# Patient Record
Sex: FEMALE | Race: WHITE | Employment: UNEMPLOYED | URBAN - METROPOLITAN AREA
[De-identification: names, ages, dates, MRNs, and addresses within clinical notes are randomized per-mention and may not be internally consistent; named-entity substitution may affect disease eponyms.]

---

## 2023-02-02 LAB — PAP AGE BASED: NORMAL

## 2024-01-22 LAB
APTT: 28.8 SEC (ref 22–32)
BASOPHILS %: 1.08 (ref 0–3)
BASOPHILS ABSOLUTE: 0.09 (ref 0–0.3)
EOSINOPHILS %: 2.21 (ref 0–10)
EOSINOPHILS ABSOLUTE: 0.19 (ref 0–1.1)
HCT VFR BLD CALC: 44.6 % (ref 37–47)
HEMOGLOBIN: 13.8 (ref 12–16)
INR BLD: 1.1 RATIO (ref 0.8–1.2)
LYMPHOCYTE %: 23.91 (ref 20–51.1)
LYMPHOCYTES ABSOLUTE: 2.03 (ref 1–5.5)
MCH RBC QN AUTO: 30.2 PG (ref 28.5–32.5)
MCHC RBC AUTO-ENTMCNC: 31 G/DL (ref 32–37)
MCV RBC AUTO: 97.5 FL (ref 80–94)
MONOCYTES %: 6.07 (ref 1.7–9.3)
MONOCYTES ABSOLUTE: 0.52 (ref 0.1–1)
NEUTROPHILS %: 66.73 (ref 42.2–75.2)
NEUTROPHILS ABSOLUTE: 5.67 (ref 2–8.1)
PDW BLD-RTO: 12.4 % (ref 8.5–15.5)
PLATELET # BLD: 296 THOU/MM3 (ref 130–400)
PROTHROMBIN TIME: 11.9 SEC (ref 9.3–12.5)
RBC: 4.58 M/UL (ref 4.2–5.4)
WBC: 8.5 THOU/ML3 (ref 4.8–10.8)

## 2024-04-29 ENCOUNTER — OFFICE VISIT (OUTPATIENT)
Dept: SURGERY | Age: 39
End: 2024-04-29

## 2024-04-29 VITALS
RESPIRATION RATE: 18 BRPM | DIASTOLIC BLOOD PRESSURE: 76 MMHG | WEIGHT: 156 LBS | HEIGHT: 67 IN | OXYGEN SATURATION: 99 % | SYSTOLIC BLOOD PRESSURE: 122 MMHG | BODY MASS INDEX: 24.48 KG/M2 | HEART RATE: 79 BPM

## 2024-04-29 DIAGNOSIS — F17.200 SMOKER: ICD-10-CM

## 2024-04-29 DIAGNOSIS — Z80.3 FAMILY HISTORY OF BREAST CANCER: ICD-10-CM

## 2024-04-29 DIAGNOSIS — N64.4 BREAST PAIN: ICD-10-CM

## 2024-04-29 DIAGNOSIS — R92.343 EXTREMELY DENSE TISSUE OF BOTH BREASTS ON MAMMOGRAPHY: ICD-10-CM

## 2024-04-29 DIAGNOSIS — Z91.89 AT HIGH RISK FOR BREAST CANCER: Primary | ICD-10-CM

## 2024-04-29 DIAGNOSIS — N60.19 MULTIPLE CYSTS OF BREAST: ICD-10-CM

## 2024-04-29 RX ORDER — SPIRONOLACTONE 100 MG/1
100 TABLET, FILM COATED ORAL DAILY
COMMUNITY

## 2024-04-29 RX ORDER — BUSPIRONE HYDROCHLORIDE 15 MG/1
15 TABLET ORAL 2 TIMES DAILY
COMMUNITY
Start: 2024-03-07 | End: 2025-03-07

## 2024-04-29 RX ORDER — FLUOXETINE HYDROCHLORIDE 20 MG/1
20 CAPSULE ORAL DAILY
COMMUNITY
Start: 2024-03-07

## 2024-04-29 RX ORDER — TRAZODONE HYDROCHLORIDE 50 MG/1
50 TABLET ORAL NIGHTLY
COMMUNITY
Start: 2024-03-07

## 2024-04-29 NOTE — PROGRESS NOTES
Review of Systems   Constitutional:  Positive for activity change and fatigue. Negative for appetite change, chills, diaphoresis, fever and unexpected weight change.   Respiratory:  Positive for chest tightness. Negative for apnea, cough, shortness of breath, wheezing and stridor.    Cardiovascular:  Negative for chest pain and leg swelling.   Gastrointestinal:  Negative for abdominal distention, abdominal pain, anal bleeding, blood in stool, constipation, diarrhea, nausea, rectal pain and vomiting.   Genitourinary:  Negative for difficulty urinating, dysuria, enuresis, flank pain, frequency, hematuria and urgency.   Musculoskeletal:  Positive for back pain.   Skin:  Negative for color change and pallor.   Allergic/Immunologic: Negative for food allergies and immunocompromised state.   Neurological:  Positive for light-headedness and headaches. Negative for syncope, speech difficulty, weakness and numbness.   Hematological:  Negative for adenopathy. Bruises/bleeds easily.   Psychiatric/Behavioral:  The patient is nervous/anxious.        
file   Social History Narrative    Not on file     Social Determinants of Health     Financial Resource Strain: Not on file   Food Insecurity: Not on file   Transportation Needs: Not on file   Physical Activity: Not on file   Stress: Not on file   Social Connections: Not on file   Intimate Partner Violence: Not on file   Housing Stability: Not on file       Review of Systems:   Constitutional:  Positive for activity change and fatigue. Negative for appetite change, chills, diaphoresis, fever and unexpected weight change.   Respiratory:  Positive for chest tightness. Negative for apnea, cough, shortness of breath, wheezing and stridor.    Cardiovascular:  Negative for chest pain and leg swelling.   Gastrointestinal:  Negative for abdominal distention, abdominal pain, anal bleeding, blood in stool, constipation, diarrhea, nausea, rectal pain and vomiting.   Genitourinary:  Negative for difficulty urinating, dysuria, enuresis, flank pain, frequency, hematuria and urgency.   Musculoskeletal:  Positive for back pain.   Skin:  Negative for color change and pallor.   Allergic/Immunologic: Negative for food allergies and immunocompromised state.   Neurological:  Positive for light-headedness and headaches. Negative for syncope, speech difficulty, weakness and numbness.   Hematological:  Negative for adenopathy. Bruises/bleeds easily.   Psychiatric/Behavioral:  The patient is nervous/anxious.         Physical Exam:  Vitals:    04/29/24 1343   BP: 122/76   Pulse: 79   Resp: 18   SpO2: 99%     General:A & O x3  HEENT:  NCAT, no mass palpated on neck exam  BREAST: The bilateral breasts are symmetric with no skin changes, nipple deformity, discharge, or discrete masses.  She has tenderness to palpation especially her bilateral upper outer quadrants.  There is no discrete mass associated with this tenderness palpation she noted that the palpation was very light and elicit tenderness.  The breast exam was not very thorough because

## 2024-05-01 ENCOUNTER — OFFICE VISIT (OUTPATIENT)
Dept: SURGERY | Age: 39
End: 2024-05-01
Payer: COMMERCIAL

## 2024-05-01 VITALS
RESPIRATION RATE: 18 BRPM | BODY MASS INDEX: 24.48 KG/M2 | OXYGEN SATURATION: 99 % | HEART RATE: 74 BPM | WEIGHT: 156 LBS | HEIGHT: 67 IN | SYSTOLIC BLOOD PRESSURE: 120 MMHG | DIASTOLIC BLOOD PRESSURE: 74 MMHG

## 2024-05-01 DIAGNOSIS — Z91.89 AT HIGH RISK FOR BREAST CANCER: Primary | ICD-10-CM

## 2024-05-01 DIAGNOSIS — Z01.818 PRE-OP TESTING: Primary | ICD-10-CM

## 2024-05-01 PROCEDURE — 99203 OFFICE O/P NEW LOW 30 MIN: CPT | Performed by: PLASTIC SURGERY

## 2024-05-01 NOTE — PROGRESS NOTES
BREAST WORKSHEET     Weight:     Height:      BMI: There is no height or weight on file to calculate BMI.     Marital Status:        [ ] S       [ ] M       [ ] D        [ ] W  How many pregnancies:   Children:   Planning to breast feed in the future?     PATIENT HISTORY  History of Breast Cancer:      [ ] Yes       [ ] No      [ ] Right    [ ] Left     [ ] Bilateral  Last Mammogram:        Where:   Family History of Breast Cancer?    [ ] Yes        [ ] No     Breast Masses:       [ ] Right     [ ] Left     Year:  Breast Biopsies:                 [ ] Right     [ ] Left     Year:  Previous Breast Surgeries:      [ ] Yes       [ ] No       Year:             Type:   Mastectomy:        [ ] Right     [ ] Left     Year:  Lumpectomy:        [ ] Right     [ ] Left     Year:  Radiation Treatment:       [ ] Yes       [ ] No       Year:               Where?        Doctor?   Miscellaneous:       [ ] Fibrocystic changes            [ ] Systemic Disease   [ ] Mastalgia                              [ ] Diabetes  Bleeding Problems:        [ ] Yes       [ ] No        Type:        Allergies:   Allergies as of 05/01/2024 - Fully Reviewed 04/29/2024   Allergen Reaction Noted    Cephalosporins Anaphylaxis 07/28/2015    Ceclor [cefaclor]  08/25/2012     Other:     PHYSICAL EXAM  Chest Wall        [ ] Pectus Deform      [ ] Scoliosis      [ ] Scars      [ ] Venous Stasis  Asymmetries:         [ ] Right      [ ] Left      [ ] Bilateral  Re-Construction       [ ] Right      [ ] Left      [ ] Bilateral  Tubular:Discussed Options:       [ ] Right      [ ] Left      [ ] Bilateral  Striae:          [ ] Right      [ ] Left      [ ] Bilateral  Other:     NEUROLOGICAL EXAM  Normal:        [ ] Yes       [ ] No           Problem/Explain:   MEASUREMENTS     Chest Circumference in Inches   Above the breast: Chest Circumference in Inches   Bellow the breast:   Breast Girth (inches)                                                         Sternal Notch 
changes that are more genetically controlled. If revision surgery is either desired or advisable within one year after the initial surgery, there may be a physician's fee.  Additionally, there may be fees associated with the hospital, facility, anesthesia, pathology, lab, and any supplies such as implants, etc. Revision policy and courtesy discounts only apply to patients who comply with post-op orders and visits.    Electronically signed by:  Zahira Trevino MD 5/1/2024

## 2024-05-03 ENCOUNTER — TELEPHONE (OUTPATIENT)
Dept: PHARMACY | Age: 39
End: 2024-05-03

## 2024-05-03 NOTE — TELEPHONE ENCOUNTER
[facility] Medication Management Clinic Note  Called patient in regard to smoking cessation referral for medication management from Dr. Barry. Patient vapes and needs to quit prior to surgery. Patient states she did quit yesterday and so far is doing ok. Discussed some medication options if the cold-turkey approach does not work well for patient. Patient lives in [place] so not super close to our location, we will close referral at this time but if patient is not successful with this smoking attempt on her own she will call clinic and arrange a visit.    Mari Cool, PharmD  [facility]  5/3/2024 3:20 PM

## 2024-05-22 ENCOUNTER — HOSPITAL ENCOUNTER (OUTPATIENT)
Dept: MRI IMAGING | Age: 39
Discharge: HOME | End: 2024-05-24
Attending: SURGERY
Payer: COMMERCIAL

## 2024-05-22 DIAGNOSIS — Z91.89 AT HIGH RISK FOR BREAST CANCER: ICD-10-CM

## 2024-05-22 DIAGNOSIS — R92.343 EXTREMELY DENSE TISSUE OF BOTH BREASTS ON MAMMOGRAPHY: ICD-10-CM

## 2024-05-22 DIAGNOSIS — N60.19 MULTIPLE CYSTS OF BREAST: ICD-10-CM

## 2024-05-22 DIAGNOSIS — Z80.3 FAMILY HISTORY OF BREAST CANCER: ICD-10-CM

## 2024-05-22 DIAGNOSIS — F17.200 SMOKER: ICD-10-CM

## 2024-05-22 DIAGNOSIS — N64.4 BREAST PAIN: ICD-10-CM

## 2024-05-22 PROCEDURE — C8908 MRI W/O FOL W/CONT, BREAST,: HCPCS

## 2024-05-22 PROCEDURE — 6360000004 HC RX CONTRAST MEDICATION: Performed by: SURGERY

## 2024-05-22 PROCEDURE — A9579 GAD-BASE MR CONTRAST NOS,1ML: HCPCS | Performed by: SURGERY

## 2024-05-22 RX ADMIN — GADOTERIDOL 20 ML: 279.3 INJECTION, SOLUTION INTRAVENOUS at 14:53

## 2024-05-24 ENCOUNTER — HOSPITAL ENCOUNTER (OUTPATIENT)
Age: 39
Discharge: HOME | End: 2024-05-24
Payer: COMMERCIAL

## 2024-05-24 DIAGNOSIS — Z01.818 PRE-OP TESTING: ICD-10-CM

## 2024-05-24 PROCEDURE — G0480 DRUG TEST DEF 1-7 CLASSES: HCPCS

## 2024-05-28 ENCOUNTER — TELEPHONE (OUTPATIENT)
Dept: SURGERY | Age: 39
End: 2024-05-28

## 2024-05-28 ENCOUNTER — TELEPHONE (OUTPATIENT)
Dept: MAMMOGRAPHY | Age: 39
End: 2024-05-28

## 2024-05-28 NOTE — TELEPHONE ENCOUNTER
----- Message from Virginie Mejia sent at 5/25/2024  8:24 AM EDT -----  Regarding: MRI results   Contact: [phone]  Hi there I was just wondering how long it would take for the MRI results I had taken the 22nd and if I should expect more testing or what comes next? Thank you for your time.

## 2024-05-28 NOTE — TELEPHONE ENCOUNTER
Could you please enter an order for a limited left breast US? This request is from the [facility] imaging done. Thank you

## 2024-05-29 DIAGNOSIS — R92.8 ABNORMAL MAGNETIC RESONANCE IMAGING OF LEFT BREAST: Primary | ICD-10-CM

## 2024-05-29 LAB
3-OH-COTININE URINE: <50 NG/ML
ANABASINE URINE: <5 NG/ML
COTININE, URINE: <15 NG/ML
NICOTINE URINE: <15 NG/ML

## 2024-06-05 ENCOUNTER — OFFICE VISIT (OUTPATIENT)
Dept: SURGERY | Age: 39
End: 2024-06-05

## 2024-06-05 VITALS
DIASTOLIC BLOOD PRESSURE: 77 MMHG | RESPIRATION RATE: 16 BRPM | HEART RATE: 77 BPM | WEIGHT: 169 LBS | SYSTOLIC BLOOD PRESSURE: 119 MMHG | OXYGEN SATURATION: 96 % | BODY MASS INDEX: 26.53 KG/M2 | HEIGHT: 67 IN

## 2024-06-05 DIAGNOSIS — Z80.3 FAMILY HISTORY OF BREAST CANCER: ICD-10-CM

## 2024-06-05 DIAGNOSIS — R92.343 EXTREMELY DENSE TISSUE OF BOTH BREASTS ON MAMMOGRAPHY: ICD-10-CM

## 2024-06-05 DIAGNOSIS — Z91.89 AT HIGH RISK FOR BREAST CANCER: Primary | ICD-10-CM

## 2024-06-05 NOTE — PROGRESS NOTES
Patient's Name/Date of Birth: Virginie Mejia / 1985 (39 y.o.)    Date: June 5, 2024    HPI: Pt is a 39 y.o. female who presents to [facility] for surgical planning for prophylactic bilateral mastectomies.  She is at high risk of breast cancer especially with family history for breast cancer and extremely dense tissue of her bilateral breast on mammography.  She has been seen by plastic surgeon.    Since her last visit she has had MRIs of her bilateral breast.  That was performed on 5/22/2024 it showed a subcentimeter oval enhancing mass in the left breast at 4-5 o'clock 7 cm from the nipple that measured 2.5 cm.  A second ultrasound was recommended for further evaluation.  The right breast was normal.  He was given BI-RADS 0.  The ultrasound is pending of that lesion.    Past Medical History:   Diagnosis Date    Psychiatric problem     depression and anxiety       Past Surgical History:   Procedure Laterality Date    ADENOIDECTOMY      TONSILLECTOMY         Current Outpatient Medications   Medication Sig Dispense Refill    busPIRone (BUSPAR) 15 MG tablet Take 15 mg by mouth 2 times daily      vitamin D (CHOLECALCIFEROL) 50 MCG (2000 UT) CAPS capsule Take 1 capsule by mouth daily      FLUoxetine (PROZAC) 20 MG capsule Take 1 capsule by mouth daily      spironolactone (ALDACTONE) 100 MG tablet Take 1 tablet by mouth daily      traZODone (DESYREL) 50 MG tablet Take 1 tablet by mouth nightly      ferrous sulfate (FE TABS) 325 (65 FE) MG EC tablet Take 1 tablet by mouth 2 times daily. (Patient not taking: Reported on 7/3/2024) 90 tablet 3     No current facility-administered medications for this visit.       Allergies   Allergen Reactions    Cephalosporins Anaphylaxis     In childhood    Ceclor [Cefaclor]        Family History   Problem Relation Age of Onset    Depression Mother     Asthma Father     Cancer Father     Diabetes Father     High Blood Pressure Father     High Cholesterol Father

## 2024-06-05 NOTE — PATIENT INSTRUCTIONS
Scheduling for the week of July 25th for bilateral nipple sparing mastectomies.  We will see what the second look ultrasound shows and adjust our plan accordingly.

## 2024-06-12 ENCOUNTER — HOSPITAL ENCOUNTER (OUTPATIENT)
Dept: ULTRASOUND IMAGING | Age: 39
Discharge: HOME | End: 2024-06-14
Payer: COMMERCIAL

## 2024-06-12 DIAGNOSIS — R92.8 ABNORMAL MAGNETIC RESONANCE IMAGING OF LEFT BREAST: ICD-10-CM

## 2024-06-12 PROCEDURE — 76642 ULTRASOUND BREAST LIMITED: CPT

## 2024-06-20 ENCOUNTER — TELEPHONE (OUTPATIENT)
Dept: SURGERY | Age: 39
End: 2024-06-20

## 2024-06-20 NOTE — TELEPHONE ENCOUNTER
I spoke with patient to schedule her surgery at [facility]. Info sent thru or[facility].    Surg appt 7/25 @ 10:30  Arrival time 8:30  PAT 7/10 @ 10:00  Post op 8/7 @ 10:00

## 2024-07-03 ENCOUNTER — OFFICE VISIT (OUTPATIENT)
Dept: SURGERY | Age: 39
End: 2024-07-03
Payer: COMMERCIAL

## 2024-07-03 VITALS
BODY MASS INDEX: 27.47 KG/M2 | WEIGHT: 175 LBS | HEIGHT: 67 IN | DIASTOLIC BLOOD PRESSURE: 79 MMHG | SYSTOLIC BLOOD PRESSURE: 122 MMHG | HEART RATE: 102 BPM

## 2024-07-03 DIAGNOSIS — Z91.89 AT HIGH RISK FOR BREAST CANCER: Primary | ICD-10-CM

## 2024-07-03 PROCEDURE — 99213 OFFICE O/P EST LOW 20 MIN: CPT | Performed by: PLASTIC SURGERY

## 2024-07-03 NOTE — PROGRESS NOTES
increased activity of any kind may open these vessels leading to a bleed, or hematoma.  Activity that increases your pulse or heart rate may cause additional bruising, swelling, and the need for return to surgery to control bleeding.  It is wise to refrain from intimate physical activities until your physician states it is safe.     Mental Health Disorders and Elective Surgery:   It is important that all patients seeking to undergo elective surgery have realistic expectations that focus on improvement rather than perfection.  Complications or less than satisfactory results are sometimes unavoidable, may require additional surgery and often are stressful.  Please openly discuss with your surgeon, prior to surgery, any history that you may have of significant emotional depression or mental health disorders.  Although many individuals may benefit psychologically from the results of elective surgery, effects on mental health cannot be accurately predicted.    Removal / Replacement of Tissue Expander:   Tissue expander breast reconstruction is a two-step process.  Removal of the tissue expander, revision of the surrounding scar tissue envelope, or replacement of tissue expander with a permanent breast implant involves surgical procedures with risks and potential complications. There may be an unacceptable appearance of the breasts following removal of the implant.    DVT/PE Risks and Advisory:  There is a risk of blood clots, Deep Vein Thrombosis (DVT) and Pulmonary Embolus (PE) with every surgical procedure. It varies with the risk factors below. The higher the risk factors, the greater the risk and the more involved you must be in both understanding these risks and, when permitted by your physician, walking and moving your legs. There may also be leg stockings, squeezing active leg devices, and possibly medicines to help lower your risk.   There are many conditions that may increase or affect risks of clotting.

## 2024-07-10 ENCOUNTER — HOSPITAL ENCOUNTER (OUTPATIENT)
Dept: PREADMISSION TESTING | Age: 39
Discharge: HOME | End: 2024-07-10
Payer: COMMERCIAL

## 2024-07-10 VITALS
SYSTOLIC BLOOD PRESSURE: 127 MMHG | TEMPERATURE: 97.1 F | WEIGHT: 175 LBS | RESPIRATION RATE: 16 BRPM | HEIGHT: 67 IN | HEART RATE: 79 BPM | BODY MASS INDEX: 27.47 KG/M2 | DIASTOLIC BLOOD PRESSURE: 84 MMHG | OXYGEN SATURATION: 100 %

## 2024-07-10 LAB
ANION GAP SERPL CALCULATED.3IONS-SCNC: 8 MMOL/L (ref 9–17)
BUN SERPL-MCNC: 10 MG/DL (ref 6–20)
BUN/CREAT SERPL: 17 (ref 9–20)
CALCIUM SERPL-MCNC: 8.6 MG/DL (ref 8.6–10.4)
CHLORIDE SERPL-SCNC: 103 MMOL/L (ref 98–107)
CO2 SERPL-SCNC: 26 MMOL/L (ref 20–31)
CREAT SERPL-MCNC: 0.6 MG/DL (ref 0.5–0.9)
EKG ATRIAL RATE: 65 BPM
EKG P-R INTERVAL: 138 MS
EKG Q-T INTERVAL: 424 MS
EKG QRS DURATION: 78 MS
EKG QTC CALCULATION (BAZETT): 440 MS
EKG R AXIS: 53 DEGREES
EKG T AXIS: 51 DEGREES
EKG VENTRICULAR RATE: 65 BPM
ERYTHROCYTE [DISTWIDTH] IN BLOOD BY AUTOMATED COUNT: 12.6 % (ref 11.8–14.4)
GFR, ESTIMATED: >90 ML/MIN/1.73M2
GLUCOSE SERPL-MCNC: 93 MG/DL (ref 70–99)
HCT VFR BLD AUTO: 40.8 % (ref 36.3–47.1)
HGB BLD-MCNC: 13.3 G/DL (ref 11.9–15.1)
MCH RBC QN AUTO: 31.3 PG (ref 25.2–33.5)
MCHC RBC AUTO-ENTMCNC: 32.6 G/DL (ref 28.4–34.8)
MCV RBC AUTO: 96 FL (ref 82.6–102.9)
NRBC BLD-RTO: 0 PER 100 WBC
PLATELET # BLD AUTO: 248 K/UL (ref 138–453)
PMV BLD AUTO: 10.1 FL (ref 8.1–13.5)
POTASSIUM SERPL-SCNC: 4 MMOL/L (ref 3.7–5.3)
RBC # BLD AUTO: 4.25 M/UL (ref 3.95–5.11)
SODIUM SERPL-SCNC: 137 MMOL/L (ref 135–144)
WBC OTHER # BLD: 9.8 K/UL (ref 3.5–11.3)

## 2024-07-10 PROCEDURE — 85027 COMPLETE CBC AUTOMATED: CPT

## 2024-07-10 PROCEDURE — 36415 COLL VENOUS BLD VENIPUNCTURE: CPT

## 2024-07-10 PROCEDURE — 93005 ELECTROCARDIOGRAM TRACING: CPT | Performed by: ANESTHESIOLOGY

## 2024-07-10 PROCEDURE — 80048 BASIC METABOLIC PNL TOTAL CA: CPT

## 2024-07-10 PROCEDURE — G0480 DRUG TEST DEF 1-7 CLASSES: HCPCS

## 2024-07-10 NOTE — PRE-PROCEDURE INSTRUCTIONS
On the Day of Your Surgery, Thursday, 7/25/24, Please Arrive At 0830 AM     Enter the hospital through the Main Entrance, take the lobby elevators to the second floor and check in at the Surgery Registration desk.     Continue to take your home medications as you normally do up to and including the night before surgery with the exception of blood thinning medications.    Blood Thinning Medications:  Please stop prescription blood thinning medications such as Apixaban (Eliquis); Clopidogrel (Plavix); Dabigatran (Pradaxa); Prasugrel (Effient); Rivaroxaban (Xarelto); Ticagrelor (Brilinta); Warfarin (Coumadin) only as directed by your surgeon and/or the prescribing physician    Some common examples of other medications that can thin your blood are: Aspirin, Ibuprofen (Advil, Motrin), Naproxen (Aleve), Meloxicam (Mobic), Celecoxib (Celebrex), Fish Oil, many Herbal Supplements.  These medications should usually be stopped at least 7 days prior to surgery.        Tylenol is OK to take for pain the week prior to surgery.    Failure to stop certain medications may interfere with your scheduled surgery.    If you receive instructions from your surgeon regarding what medications to stop prior to surgery, please follow those specific instructions.    Please take the following medication(s) the day of surgery with small sips of water:              Fluoxetine, buspirone.     If you are on medicare and there is a possibility that you will be admitted following surgery:   Please bring your prescription medications in their original bottles with pharmacy label on the day of surgery.    Please use your inhaler(s) if needed and bring your inhaler(s) from home the day of surgery.    Showering Before Surgery:     You can play an important role in your own health by carefully washing before surgery. Shower the night before and the morning of surgery using the instructions below. If you are allergic to Chlorhexidine Gluconate (CHG) use  tobacco after midnight.  No alcoholic beverages for 24 hours prior to surgery.  2. You may brush your teeth but do not swallow the water.  3. If you wear glasses bring a case for them if you have one.  No contacts should be worn the day of surgery.  You may also bring your hearing aids. If you have dentures, most surgical procedures involving anesthesia will require that you remove them prior to surgery.  4. If you sleep with a CPAP or BiPAP machine at home and plan on staying in the hospital overnight after surgery, please bring your machine with you.   5. Do not wear any jewelry or body piercings the day of surgery.  No nail polish on the operative extremity (arm/hand or leg/foot surgeries)   6. If you are staying overnight with us, you may bring a small bag of necessary personal items.   7. Please wear loose, comfortable clothing.  If you are potentially going to have a cast, sling, brace or bulky dressing, make sure to wear clothing that will fit over it.   8. In case of illness - If you have cold or flu like symptoms (high fever, runny nose, sore throat, cough, etc.) rash, nausea, vomiting, loose stools, and/or recent contact with someone who has a contagious disease (chicken pox, measles, COVID-19, etc.).  Please call your surgeon before coming to the hospital.    Transportation After Your Surgery/Procedure:     If you are going home the same day of surgery you need someone to drive you home.  Your  must be at least 18 years of age.  A taxi cab or other nonmedical public transportation is not acceptable unless you have someone to ride home in the vehicle with you.   For your safety, someone must remain with you for the first 24 hours after your surgery if you receive anesthesia or medication.  If you do not have someone to stay with you, your procedure may be cancelled.  As a patient at [facility] you can expect quality medical and nursing care that is centered on you individual needs.  Our

## 2024-07-24 ENCOUNTER — ANESTHESIA EVENT (OUTPATIENT)
Dept: OPERATING ROOM | Age: 39
End: 2024-07-24
Payer: COMMERCIAL

## 2024-07-25 ENCOUNTER — HOSPITAL ENCOUNTER (OUTPATIENT)
Age: 39
Discharge: HOME | End: 2024-07-26
Attending: SURGERY | Admitting: SURGERY
Payer: COMMERCIAL

## 2024-07-25 ENCOUNTER — ANESTHESIA (OUTPATIENT)
Dept: OPERATING ROOM | Age: 39
End: 2024-07-25
Payer: COMMERCIAL

## 2024-07-25 DIAGNOSIS — R92.8 ABNORMAL MRI, BREAST: ICD-10-CM

## 2024-07-25 PROBLEM — Z91.89 AT HIGH RISK FOR BREAST CANCER: Status: ACTIVE | Noted: 2024-07-25

## 2024-07-25 LAB
HCG, PREGNANCY URINE (POC): NEGATIVE
HCT VFR BLD AUTO: 30.4 % (ref 36.3–47.1)
HGB BLD-MCNC: 9.8 G/DL (ref 11.9–15.1)

## 2024-07-25 PROCEDURE — 2709999900 HC NON-CHARGEABLE SUPPLY: Performed by: SURGERY

## 2024-07-25 PROCEDURE — 85018 HEMOGLOBIN: CPT

## 2024-07-25 PROCEDURE — 7100000001 HC PACU RECOVERY - ADDTL 15 MIN: Performed by: SURGERY

## 2024-07-25 PROCEDURE — 6360000002 HC RX W HCPCS: Performed by: PLASTIC SURGERY

## 2024-07-25 PROCEDURE — 3700000000 HC ANESTHESIA ATTENDED CARE: Performed by: SURGERY

## 2024-07-25 PROCEDURE — 6370000000 HC RX 637 (ALT 250 FOR IP): Performed by: ANESTHESIOLOGY

## 2024-07-25 PROCEDURE — 3600000012 HC SURGERY LEVEL 2 ADDTL 15MIN: Performed by: SURGERY

## 2024-07-25 PROCEDURE — 36415 COLL VENOUS BLD VENIPUNCTURE: CPT

## 2024-07-25 PROCEDURE — 6360000002 HC RX W HCPCS: Performed by: ANESTHESIOLOGY

## 2024-07-25 PROCEDURE — 2500000003 HC RX 250 WO HCPCS: Performed by: SURGERY

## 2024-07-25 PROCEDURE — 7100000000 HC PACU RECOVERY - FIRST 15 MIN: Performed by: SURGERY

## 2024-07-25 PROCEDURE — A4217 STERILE WATER/SALINE, 500 ML: HCPCS | Performed by: PLASTIC SURGERY

## 2024-07-25 PROCEDURE — 6360000002 HC RX W HCPCS: Performed by: SURGERY

## 2024-07-25 PROCEDURE — 85014 HEMATOCRIT: CPT

## 2024-07-25 PROCEDURE — 6370000000 HC RX 637 (ALT 250 FOR IP): Performed by: SURGERY

## 2024-07-25 PROCEDURE — 76942 ECHO GUIDE FOR BIOPSY: CPT | Performed by: ANESTHESIOLOGY

## 2024-07-25 PROCEDURE — 2500000003 HC RX 250 WO HCPCS

## 2024-07-25 PROCEDURE — 88342 IMHCHEM/IMCYTCHM 1ST ANTB: CPT

## 2024-07-25 PROCEDURE — 6360000002 HC RX W HCPCS: Performed by: NURSE ANESTHETIST, CERTIFIED REGISTERED

## 2024-07-25 PROCEDURE — 2580000003 HC RX 258: Performed by: ANESTHESIOLOGY

## 2024-07-25 PROCEDURE — 3600000002 HC SURGERY LEVEL 2 BASE: Performed by: SURGERY

## 2024-07-25 PROCEDURE — 15777 ACELLULAR DERM MATRIX IMPLT: CPT | Performed by: PLASTIC SURGERY

## 2024-07-25 PROCEDURE — C1889 IMPLANT/INSERT DEVICE, NOC: HCPCS | Performed by: SURGERY

## 2024-07-25 PROCEDURE — 19357 TISS XPNDR PLMT BRST RCNSTJ: CPT | Performed by: PLASTIC SURGERY

## 2024-07-25 PROCEDURE — 81025 URINE PREGNANCY TEST: CPT

## 2024-07-25 PROCEDURE — 3700000001 HC ADD 15 MINUTES (ANESTHESIA): Performed by: SURGERY

## 2024-07-25 PROCEDURE — 19303 MAST SIMPLE COMPLETE: CPT | Performed by: SURGERY

## 2024-07-25 PROCEDURE — 2500000003 HC RX 250 WO HCPCS: Performed by: NURSE ANESTHETIST, CERTIFIED REGISTERED

## 2024-07-25 PROCEDURE — 2720000010 HC SURG SUPPLY STERILE: Performed by: SURGERY

## 2024-07-25 PROCEDURE — 88307 TISSUE EXAM BY PATHOLOGIST: CPT

## 2024-07-25 PROCEDURE — 2580000003 HC RX 258: Performed by: PLASTIC SURGERY

## 2024-07-25 PROCEDURE — 6360000002 HC RX W HCPCS: Performed by: SPECIALIST

## 2024-07-25 PROCEDURE — 2580000003 HC RX 258: Performed by: SURGERY

## 2024-07-25 PROCEDURE — P9041 ALBUMIN (HUMAN),5%, 50ML: HCPCS | Performed by: SPECIALIST

## 2024-07-25 PROCEDURE — 6360000002 HC RX W HCPCS

## 2024-07-25 DEVICE — BREAST TISSUE EXPANDER, SUTURE TABS, INTEGRAL INJECTION DOME, 550CC
Type: IMPLANTABLE DEVICE | Site: BREAST | Status: FUNCTIONAL
Brand: MENTOR CPX4 PLUS, SMOOTH, MEDIUM HEIGHT

## 2024-07-25 DEVICE — TISSUE ALLODERM SELECT 16X20CM: Type: IMPLANTABLE DEVICE | Site: BREAST | Status: FUNCTIONAL

## 2024-07-25 RX ORDER — ALBUMIN, HUMAN INJ 5% 5 %
SOLUTION INTRAVENOUS PRN
Status: DISCONTINUED | OUTPATIENT
Start: 2024-07-25 | End: 2024-07-25 | Stop reason: SDUPTHER

## 2024-07-25 RX ORDER — DEXTROSE MONOHYDRATE, SODIUM CHLORIDE, AND POTASSIUM CHLORIDE 50; 1.49; 4.5 G/1000ML; G/1000ML; G/1000ML
INJECTION, SOLUTION INTRAVENOUS CONTINUOUS
Status: DISCONTINUED | OUTPATIENT
Start: 2024-07-25 | End: 2024-07-26 | Stop reason: HOSPADM

## 2024-07-25 RX ORDER — MEPERIDINE HYDROCHLORIDE 50 MG/ML
12.5 INJECTION INTRAMUSCULAR; INTRAVENOUS; SUBCUTANEOUS EVERY 5 MIN PRN
Status: DISCONTINUED | OUTPATIENT
Start: 2024-07-25 | End: 2024-07-25 | Stop reason: HOSPADM

## 2024-07-25 RX ORDER — ONDANSETRON 2 MG/ML
4 INJECTION INTRAMUSCULAR; INTRAVENOUS EVERY 6 HOURS PRN
Status: DISCONTINUED | OUTPATIENT
Start: 2024-07-25 | End: 2024-07-26 | Stop reason: HOSPADM

## 2024-07-25 RX ORDER — SODIUM CHLORIDE 9 MG/ML
INJECTION, SOLUTION INTRAVENOUS CONTINUOUS
Status: DISCONTINUED | OUTPATIENT
Start: 2024-07-25 | End: 2024-07-25 | Stop reason: HOSPADM

## 2024-07-25 RX ORDER — BISACODYL 5 MG/1
5 TABLET, DELAYED RELEASE ORAL DAILY
Status: DISCONTINUED | OUTPATIENT
Start: 2024-07-25 | End: 2024-07-26 | Stop reason: HOSPADM

## 2024-07-25 RX ORDER — SCOLOPAMINE TRANSDERMAL SYSTEM 1 MG/1
PATCH, EXTENDED RELEASE TRANSDERMAL
Status: COMPLETED
Start: 2024-07-25 | End: 2024-07-25

## 2024-07-25 RX ORDER — FAMOTIDINE 10 MG/ML
INJECTION, SOLUTION INTRAVENOUS PRN
Status: DISCONTINUED | OUTPATIENT
Start: 2024-07-25 | End: 2024-07-25 | Stop reason: SDUPTHER

## 2024-07-25 RX ORDER — PROPOFOL 10 MG/ML
INJECTION, EMULSION INTRAVENOUS PRN
Status: DISCONTINUED | OUTPATIENT
Start: 2024-07-25 | End: 2024-07-25 | Stop reason: SDUPTHER

## 2024-07-25 RX ORDER — DEXAMETHASONE SODIUM PHOSPHATE 10 MG/ML
INJECTION, SOLUTION INTRAMUSCULAR; INTRAVENOUS PRN
Status: DISCONTINUED | OUTPATIENT
Start: 2024-07-25 | End: 2024-07-25 | Stop reason: SDUPTHER

## 2024-07-25 RX ORDER — ACETAMINOPHEN 325 MG/1
650 TABLET ORAL EVERY 6 HOURS
Status: DISCONTINUED | OUTPATIENT
Start: 2024-07-25 | End: 2024-07-26 | Stop reason: HOSPADM

## 2024-07-25 RX ORDER — CLINDAMYCIN PHOSPHATE 600 MG/50ML
600 INJECTION, SOLUTION INTRAVENOUS ONCE
Status: COMPLETED | OUTPATIENT
Start: 2024-07-25 | End: 2024-07-25

## 2024-07-25 RX ORDER — SODIUM CHLORIDE, SODIUM LACTATE, POTASSIUM CHLORIDE, CALCIUM CHLORIDE 600; 310; 30; 20 MG/100ML; MG/100ML; MG/100ML; MG/100ML
INJECTION, SOLUTION INTRAVENOUS CONTINUOUS
Status: DISCONTINUED | OUTPATIENT
Start: 2024-07-25 | End: 2024-07-25 | Stop reason: HOSPADM

## 2024-07-25 RX ORDER — MORPHINE SULFATE 4 MG/ML
4 INJECTION, SOLUTION INTRAMUSCULAR; INTRAVENOUS
Status: DISCONTINUED | OUTPATIENT
Start: 2024-07-25 | End: 2024-07-26 | Stop reason: HOSPADM

## 2024-07-25 RX ORDER — SODIUM CHLORIDE 0.9 % (FLUSH) 0.9 %
5-40 SYRINGE (ML) INJECTION PRN
Status: DISCONTINUED | OUTPATIENT
Start: 2024-07-25 | End: 2024-07-25 | Stop reason: HOSPADM

## 2024-07-25 RX ORDER — SODIUM CHLORIDE 9 MG/ML
INJECTION, SOLUTION INTRAVENOUS PRN
Status: DISCONTINUED | OUTPATIENT
Start: 2024-07-25 | End: 2024-07-25 | Stop reason: HOSPADM

## 2024-07-25 RX ORDER — OXYCODONE HYDROCHLORIDE 5 MG/1
5 TABLET ORAL
Status: DISCONTINUED | OUTPATIENT
Start: 2024-07-25 | End: 2024-07-25 | Stop reason: HOSPADM

## 2024-07-25 RX ORDER — OXYCODONE HYDROCHLORIDE 5 MG/1
5 TABLET ORAL EVERY 4 HOURS PRN
Status: DISCONTINUED | OUTPATIENT
Start: 2024-07-25 | End: 2024-07-26 | Stop reason: HOSPADM

## 2024-07-25 RX ORDER — LIDOCAINE HYDROCHLORIDE 10 MG/ML
1 INJECTION, SOLUTION EPIDURAL; INFILTRATION; INTRACAUDAL; PERINEURAL
Status: DISCONTINUED | OUTPATIENT
Start: 2024-07-26 | End: 2024-07-25 | Stop reason: HOSPADM

## 2024-07-25 RX ORDER — SCOLOPAMINE TRANSDERMAL SYSTEM 1 MG/1
PATCH, EXTENDED RELEASE TRANSDERMAL PRN
Status: DISCONTINUED | OUTPATIENT
Start: 2024-07-25 | End: 2024-07-25 | Stop reason: SDUPTHER

## 2024-07-25 RX ORDER — FENTANYL CITRATE 50 UG/ML
25 INJECTION, SOLUTION INTRAMUSCULAR; INTRAVENOUS EVERY 5 MIN PRN
Status: DISCONTINUED | OUTPATIENT
Start: 2024-07-25 | End: 2024-07-25 | Stop reason: HOSPADM

## 2024-07-25 RX ORDER — FLUOXETINE HYDROCHLORIDE 20 MG/1
20 CAPSULE ORAL DAILY
Status: DISCONTINUED | OUTPATIENT
Start: 2024-07-25 | End: 2024-07-26 | Stop reason: HOSPADM

## 2024-07-25 RX ORDER — ONDANSETRON 2 MG/ML
INJECTION INTRAMUSCULAR; INTRAVENOUS PRN
Status: DISCONTINUED | OUTPATIENT
Start: 2024-07-25 | End: 2024-07-25 | Stop reason: SDUPTHER

## 2024-07-25 RX ORDER — SPIRONOLACTONE 100 MG/1
100 TABLET, FILM COATED ORAL DAILY
Status: DISCONTINUED | OUTPATIENT
Start: 2024-07-25 | End: 2024-07-26 | Stop reason: HOSPADM

## 2024-07-25 RX ORDER — PROCHLORPERAZINE EDISYLATE 5 MG/ML
10 INJECTION INTRAMUSCULAR; INTRAVENOUS
Status: COMPLETED | OUTPATIENT
Start: 2024-07-25 | End: 2024-07-25

## 2024-07-25 RX ORDER — SODIUM CHLORIDE, SODIUM LACTATE, POTASSIUM CHLORIDE, AND CALCIUM CHLORIDE .6; .31; .03; .02 G/100ML; G/100ML; G/100ML; G/100ML
1000 INJECTION, SOLUTION INTRAVENOUS ONCE
Status: COMPLETED | OUTPATIENT
Start: 2024-07-25 | End: 2024-07-25

## 2024-07-25 RX ORDER — LIDOCAINE HYDROCHLORIDE 20 MG/ML
INJECTION, SOLUTION EPIDURAL; INFILTRATION; INTRACAUDAL; PERINEURAL PRN
Status: DISCONTINUED | OUTPATIENT
Start: 2024-07-25 | End: 2024-07-25 | Stop reason: SDUPTHER

## 2024-07-25 RX ORDER — MIDAZOLAM HYDROCHLORIDE 1 MG/ML
INJECTION INTRAMUSCULAR; INTRAVENOUS PRN
Status: DISCONTINUED | OUTPATIENT
Start: 2024-07-25 | End: 2024-07-25 | Stop reason: SDUPTHER

## 2024-07-25 RX ORDER — NALOXONE HYDROCHLORIDE 0.4 MG/ML
INJECTION, SOLUTION INTRAMUSCULAR; INTRAVENOUS; SUBCUTANEOUS PRN
Status: DISCONTINUED | OUTPATIENT
Start: 2024-07-25 | End: 2024-07-25 | Stop reason: HOSPADM

## 2024-07-25 RX ORDER — HYDROMORPHONE HYDROCHLORIDE 1 MG/ML
0.5 INJECTION, SOLUTION INTRAMUSCULAR; INTRAVENOUS; SUBCUTANEOUS EVERY 5 MIN PRN
Status: DISCONTINUED | OUTPATIENT
Start: 2024-07-25 | End: 2024-07-25 | Stop reason: HOSPADM

## 2024-07-25 RX ORDER — DIPHENHYDRAMINE HYDROCHLORIDE 50 MG/ML
INJECTION INTRAMUSCULAR; INTRAVENOUS PRN
Status: DISCONTINUED | OUTPATIENT
Start: 2024-07-25 | End: 2024-07-25 | Stop reason: SDUPTHER

## 2024-07-25 RX ORDER — SODIUM CHLORIDE 0.9 % (FLUSH) 0.9 %
5-40 SYRINGE (ML) INJECTION EVERY 12 HOURS SCHEDULED
Status: DISCONTINUED | OUTPATIENT
Start: 2024-07-25 | End: 2024-07-25 | Stop reason: HOSPADM

## 2024-07-25 RX ORDER — ROPIVACAINE HYDROCHLORIDE 2 MG/ML
INJECTION, SOLUTION EPIDURAL; INFILTRATION; PERINEURAL
Status: DISCONTINUED | OUTPATIENT
Start: 2024-07-25 | End: 2024-07-25 | Stop reason: SDUPTHER

## 2024-07-25 RX ORDER — SODIUM CHLORIDE 0.9 % (FLUSH) 0.9 %
5-40 SYRINGE (ML) INJECTION EVERY 12 HOURS SCHEDULED
Status: DISCONTINUED | OUTPATIENT
Start: 2024-07-25 | End: 2024-07-26 | Stop reason: HOSPADM

## 2024-07-25 RX ORDER — MORPHINE SULFATE 2 MG/ML
2 INJECTION, SOLUTION INTRAMUSCULAR; INTRAVENOUS
Status: DISCONTINUED | OUTPATIENT
Start: 2024-07-25 | End: 2024-07-26 | Stop reason: HOSPADM

## 2024-07-25 RX ORDER — KETOROLAC TROMETHAMINE 30 MG/ML
30 INJECTION, SOLUTION INTRAMUSCULAR; INTRAVENOUS ONCE
Status: COMPLETED | OUTPATIENT
Start: 2024-07-25 | End: 2024-07-25

## 2024-07-25 RX ORDER — OXYCODONE HYDROCHLORIDE 5 MG/1
10 TABLET ORAL EVERY 4 HOURS PRN
Status: DISCONTINUED | OUTPATIENT
Start: 2024-07-25 | End: 2024-07-26 | Stop reason: HOSPADM

## 2024-07-25 RX ORDER — ONDANSETRON 4 MG/1
4 TABLET, ORALLY DISINTEGRATING ORAL EVERY 8 HOURS PRN
Status: DISCONTINUED | OUTPATIENT
Start: 2024-07-25 | End: 2024-07-26 | Stop reason: HOSPADM

## 2024-07-25 RX ORDER — DIPHENHYDRAMINE HYDROCHLORIDE 50 MG/ML
12.5 INJECTION INTRAMUSCULAR; INTRAVENOUS
Status: DISCONTINUED | OUTPATIENT
Start: 2024-07-25 | End: 2024-07-25 | Stop reason: HOSPADM

## 2024-07-25 RX ORDER — ROCURONIUM BROMIDE 10 MG/ML
INJECTION, SOLUTION INTRAVENOUS PRN
Status: DISCONTINUED | OUTPATIENT
Start: 2024-07-25 | End: 2024-07-25 | Stop reason: SDUPTHER

## 2024-07-25 RX ORDER — METOCLOPRAMIDE HYDROCHLORIDE 5 MG/ML
10 INJECTION INTRAMUSCULAR; INTRAVENOUS
Status: DISCONTINUED | OUTPATIENT
Start: 2024-07-25 | End: 2024-07-25 | Stop reason: HOSPADM

## 2024-07-25 RX ORDER — TRAZODONE HYDROCHLORIDE 50 MG/1
50 TABLET ORAL NIGHTLY
Status: DISCONTINUED | OUTPATIENT
Start: 2024-07-25 | End: 2024-07-26 | Stop reason: HOSPADM

## 2024-07-25 RX ORDER — FENTANYL CITRATE 50 UG/ML
INJECTION, SOLUTION INTRAMUSCULAR; INTRAVENOUS PRN
Status: DISCONTINUED | OUTPATIENT
Start: 2024-07-25 | End: 2024-07-25 | Stop reason: SDUPTHER

## 2024-07-25 RX ADMIN — TRAZODONE HYDROCHLORIDE 50 MG: 50 TABLET ORAL at 21:34

## 2024-07-25 RX ADMIN — ROPIVACAINE HYDROCHLORIDE 60 ML: 2 INJECTION, SOLUTION EPIDURAL; INFILTRATION at 12:27

## 2024-07-25 RX ADMIN — SODIUM CHLORIDE, POTASSIUM CHLORIDE, SODIUM LACTATE AND CALCIUM CHLORIDE: 600; 310; 30; 20 INJECTION, SOLUTION INTRAVENOUS at 09:36

## 2024-07-25 RX ADMIN — DIPHENHYDRAMINE HYDROCHLORIDE 12.5 MG: 50 INJECTION INTRAMUSCULAR; INTRAVENOUS at 12:16

## 2024-07-25 RX ADMIN — BISACODYL 5 MG: 5 TABLET, COATED ORAL at 18:46

## 2024-07-25 RX ADMIN — PROCHLORPERAZINE EDISYLATE 10 MG: 5 INJECTION INTRAMUSCULAR; INTRAVENOUS at 16:11

## 2024-07-25 RX ADMIN — SPIRONOLACTONE 100 MG: 100 TABLET ORAL at 21:34

## 2024-07-25 RX ADMIN — LIDOCAINE HYDROCHLORIDE 100 MG: 20 INJECTION, SOLUTION EPIDURAL; INFILTRATION; INTRACAUDAL; PERINEURAL at 12:16

## 2024-07-25 RX ADMIN — FENTANYL CITRATE 25 MCG: 50 INJECTION INTRAMUSCULAR; INTRAVENOUS at 12:59

## 2024-07-25 RX ADMIN — CLINDAMYCIN PHOSPHATE 600 MG: 600 INJECTION, SOLUTION INTRAVENOUS at 12:23

## 2024-07-25 RX ADMIN — KETOROLAC TROMETHAMINE 30 MG: 30 INJECTION, SOLUTION INTRAMUSCULAR at 17:13

## 2024-07-25 RX ADMIN — SODIUM CHLORIDE, POTASSIUM CHLORIDE, SODIUM LACTATE AND CALCIUM CHLORIDE: 600; 310; 30; 20 INJECTION, SOLUTION INTRAVENOUS at 15:24

## 2024-07-25 RX ADMIN — SODIUM CHLORIDE, POTASSIUM CHLORIDE, SODIUM LACTATE AND CALCIUM CHLORIDE: 600; 310; 30; 20 INJECTION, SOLUTION INTRAVENOUS at 13:47

## 2024-07-25 RX ADMIN — SUGAMMADEX 100 MG: 100 INJECTION, SOLUTION INTRAVENOUS at 15:45

## 2024-07-25 RX ADMIN — FLUOXETINE HYDROCHLORIDE 20 MG: 20 CAPSULE ORAL at 21:34

## 2024-07-25 RX ADMIN — SODIUM CHLORIDE, PRESERVATIVE FREE 10 ML: 5 INJECTION INTRAVENOUS at 21:34

## 2024-07-25 RX ADMIN — POTASSIUM CHLORIDE, DEXTROSE MONOHYDRATE AND SODIUM CHLORIDE: 150; 5; 450 INJECTION, SOLUTION INTRAVENOUS at 21:48

## 2024-07-25 RX ADMIN — FENTANYL CITRATE 25 MCG: 50 INJECTION INTRAMUSCULAR; INTRAVENOUS at 12:16

## 2024-07-25 RX ADMIN — BUSPIRONE HYDROCHLORIDE 15 MG: 10 TABLET ORAL at 21:34

## 2024-07-25 RX ADMIN — MIDAZOLAM 2 MG: 1 INJECTION INTRAMUSCULAR; INTRAVENOUS at 12:08

## 2024-07-25 RX ADMIN — PROPOFOL 200 MG: 10 INJECTION, EMULSION INTRAVENOUS at 12:16

## 2024-07-25 RX ADMIN — ALBUMIN (HUMAN) 25 G: 12.5 INJECTION, SOLUTION INTRAVENOUS at 13:53

## 2024-07-25 RX ADMIN — PROPOFOL 25 MCG/KG/MIN: 10 INJECTION, EMULSION INTRAVENOUS at 12:24

## 2024-07-25 RX ADMIN — SCOPALAMINE 1 PATCH: 1 PATCH, EXTENDED RELEASE TRANSDERMAL at 11:46

## 2024-07-25 RX ADMIN — ROCURONIUM BROMIDE 50 MG: 10 INJECTION, SOLUTION INTRAVENOUS at 12:16

## 2024-07-25 RX ADMIN — OXYCODONE HYDROCHLORIDE 10 MG: 5 TABLET ORAL at 19:44

## 2024-07-25 RX ADMIN — SODIUM CHLORIDE, POTASSIUM CHLORIDE, SODIUM LACTATE AND CALCIUM CHLORIDE 1000 ML: 600; 310; 30; 20 INJECTION, SOLUTION INTRAVENOUS at 17:14

## 2024-07-25 RX ADMIN — DEXAMETHASONE SODIUM PHOSPHATE 10 MG: 10 INJECTION, SOLUTION INTRAMUSCULAR; INTRAVENOUS at 12:16

## 2024-07-25 RX ADMIN — ONDANSETRON 4 MG: 2 INJECTION INTRAMUSCULAR; INTRAVENOUS at 15:22

## 2024-07-25 RX ADMIN — FENTANYL CITRATE 50 MCG: 50 INJECTION INTRAMUSCULAR; INTRAVENOUS at 13:00

## 2024-07-25 RX ADMIN — FAMOTIDINE 20 MG: 10 INJECTION, SOLUTION INTRAVENOUS at 12:16

## 2024-07-25 RX ADMIN — ACETAMINOPHEN 650 MG: 325 TABLET ORAL at 18:46

## 2024-07-25 RX ADMIN — HYDROMORPHONE HYDROCHLORIDE 0.5 MG: 1 INJECTION, SOLUTION INTRAMUSCULAR; INTRAVENOUS; SUBCUTANEOUS at 16:04

## 2024-07-25 ASSESSMENT — PAIN DESCRIPTION - DESCRIPTORS
DESCRIPTORS: ACHING;SORE
DESCRIPTORS: THROBBING;STABBING
DESCRIPTORS: ACHING;SORE

## 2024-07-25 ASSESSMENT — PAIN DESCRIPTION - LOCATION
LOCATION: CHEST
LOCATION: CHEST
LOCATION: BREAST

## 2024-07-25 ASSESSMENT — PAIN DESCRIPTION - FREQUENCY
FREQUENCY: CONTINUOUS
FREQUENCY: CONTINUOUS

## 2024-07-25 ASSESSMENT — PAIN SCALES - GENERAL
PAINLEVEL_OUTOF10: 6
PAINLEVEL_OUTOF10: 9
PAINLEVEL_OUTOF10: 7
PAINLEVEL_OUTOF10: 3

## 2024-07-25 ASSESSMENT — PAIN - FUNCTIONAL ASSESSMENT
PAIN_FUNCTIONAL_ASSESSMENT: ACTIVITIES ARE NOT PREVENTED
PAIN_FUNCTIONAL_ASSESSMENT: 0-10

## 2024-07-25 ASSESSMENT — PAIN DESCRIPTION - PAIN TYPE
TYPE: SURGICAL PAIN
TYPE: SURGICAL PAIN

## 2024-07-25 ASSESSMENT — PAIN DESCRIPTION - ORIENTATION
ORIENTATION: RIGHT;LEFT
ORIENTATION: RIGHT;LEFT
ORIENTATION: LEFT;RIGHT

## 2024-07-25 ASSESSMENT — PAIN DESCRIPTION - ONSET
ONSET: ON-GOING
ONSET: ON-GOING

## 2024-07-25 NOTE — OP NOTE
Operative Note      Patient: Virginie Mejia  YOB: 1985  MRN: [id number]    Date of Procedure: 7/25/2024    Pre-Op Diagnosis Codes:     * Abnormal MRI, breast [R92.8]    Post-Op Diagnosis: Same       Procedure(s):  BREAST MASTECTOMY BILATERAL NIPPLE SPARING  BILATERAL TISSUE EXPANDER PLACEMENT, BIOPATCH AROUND ANABEL, ADI INCISIONAL BREAST VAC, SERATUS FLAP, PECT BLOCK  Breast weight on the right was 535 g  Breast weight on the left was 535 g  550 cc expander was placed on the right filled with air placed in the prepectoralis pocket  550 cc expander was placed on the left filled with air placed in the prepectoralis pocket  ADM on the right 16 x 20 cm  ADM on the left measuring 16 x 20 cm  Surgeon(s):  Zahira Trevino MD Bland, Keiva, MD    Assistant:   Surgical Assistant: Autumn Law    Anesthesia: General    Estimated Blood Loss (mL): 500    Complications: None    Specimens:   ID Type Source Tests Collected by Time Destination   A : RIGHT BREAST. SHORT STITCH SUPERIOR MARGIN, LONG STITCH LATERAL MARGIN, INK ANTERIOR MARGIN Tissue Breast SURGICAL PATHOLOGY Aurea Barry MD 7/25/2024 1336    B : LEFT BREAST. SHORT STITCH SUPERIOR MARGIN, LONG STITCH LATERAL MARGIN, IN ANTERIOR MARGIN Tissue Breast SURGICAL PATHOLOGY Aurea Barry MD 7/25/2024 1419        Implants:  Implant Name Type Inv. Item Serial No.  Lot No. LRB No. Used Action   EXPANDER TISS 550CC [facility] CPX4 PLUS - [id number]  EXPANDER TISS 550CC [facility] CPX4 PLUS [id number] [employer] [facility] [id number] Right 1 Implanted   TISSUE ALLODERM SELECT [id number] - [id number] Bone/Graft/Tissue/Human/Synth TISSUE ALLODERM SELECT [id number]  [employer] [id number] Right 1 Implanted   TISSUE ALLODERM SELECT [id number] - [id number] Bone/Graft/Tissue/Human/Synth TISSUE ALLODERM SELECT [id number]  [employer] [id number] Left 1 Implanted   EXPANDER TISS 550CC [facility] CPX4 PLUS - [id number]  EXPANDER TISS 550CC [facility] CPX4 PLUS [id number]  soaked in triple antibiotic solution.  The pocket was irrigated with triple antibiotic solution.  Then and #19 Delmar drain was placed through a separate stab wound.  The drain was sutured in place using 2-0 Prolene.  Then the chest wall was cleansed.  The gloves were changed.  Then the expander was placed.  Then the expander was sutured in place using 2-0 PDS using the suture tabs medial, lateral ,superior, medial, and inferior tabs were sutured in place.  Then the skin was redraped.  Then the skin was closed in the following manner: 2-0 Vicryl was used in an interrupted manner in the deep tissue.  And 2-0 Quill was used in a subcuticular manner.     An incisional wound VAC was placed.  Then a surgical bra was placed.  Patient tolerated the procedure well.  Patient was transferred to recovery room in a stable condition    Electronically signed by Zahira Trevino MD on 7/25/2024 at 3:51 PM

## 2024-07-25 NOTE — PROGRESS NOTES
Patient arrived from PACU with family at bedside. Orders released and reviewed. Patient oriented to room. Patient resting in bed with call light in reach side rails up x2 and bed alarm on.

## 2024-07-25 NOTE — ANESTHESIA PRE PROCEDURE
Department of Anesthesiology  Preprocedure Note       Name:  Virginie Mejia   Age:  39 y.o.  :  1985                                          MRN:  [id number]         Date:  2024      Surgeon: Surgeon(s):  Aurea Barry MD Eslami, Jaleh, MD    Procedure: Procedure(s):  BREAST MASTECTOMY BILATERAL NIPPLE SPARING  BILATERAL TISSUE EXPANDER PLACEMENT, BIOPATCH AROUND ANABEL, ADI INCISIONAL BREAST VAC, SERATUS FLAP, PECT BLOCK    Medications prior to admission:   Prior to Admission medications    Medication Sig Start Date End Date Taking? Authorizing Provider   busPIRone (BUSPAR) 15 MG tablet Take 15 mg by mouth 2 times daily 3/7/24 3/7/25  ProviderAlejandra MD   vitamin D (CHOLECALCIFEROL) 50 MCG (2000) CAPS capsule Take 1 capsule by mouth daily 3/7/24   Alejandra Perez MD   FLUoxetine (PROZAC) 20 MG capsule Take 1 capsule by mouth daily 3/7/24   ProviderAlejandra MD   spironolactone (ALDACTONE) 100 MG tablet Take 1 tablet by mouth daily    ProviderAlejandra MD   traZODone (DESYREL) 50 MG tablet Take 1 tablet by mouth nightly 3/7/24   ProviderAlejandra MD   ferrous sulfate (FE TABS) 325 (65 FE) MG EC tablet Take 1 tablet by mouth 2 times daily. 12   Gemini Casillas MD       Current medications:    Current Facility-Administered Medications   Medication Dose Route Frequency Provider Last Rate Last Admin   • [START ON 2024] lidocaine PF 1 % injection 1 mL  1 mL IntraDERmal Once PRN Arsenio Ford DO       • 0.9 % sodium chloride infusion   IntraVENous Continuous Arsenio Ford DO       • lactated ringers IV soln infusion   IntraVENous Continuous Arsenio Ford  mL/hr at 24 0936 New Bag at 24 0936   • sodium chloride flush 0.9 % injection 5-40 mL  5-40 mL IntraVENous 2 times per day Arsenio Ford DO       • sodium chloride flush 0.9 % injection 5-40 mL  5-40 mL IntraVENous PRN Arsenio Ford DO       • 0.9 % sodium chloride

## 2024-07-25 NOTE — OP NOTE
Operative Note      Patient: Virginie Mejia  YOB: 1985  MRN: [id number]    Date of Procedure: 7/25/2024    Pre-Op Diagnosis Codes:     * Abnormal MRI, breast [R92.8]  High risk for breast cancer    Post-Op Diagnosis: Same       Procedure(s):  BREAST MASTECTOMY BILATERAL NIPPLE SPARING  BILATERAL TISSUE EXPANDER PLACEMENT, BIOPATCH AROUND ANABEL, ADI INCISIONAL BREAST VAC, SERATUS FLAP, PECT BLOCK    Surgeon(s):  Aurea Barry MD Eslami, Jaleh, MD    Assistant:   Surgical Assistant: Autumn Law    Anesthesia: General    Estimated Blood Loss (mL): 500    Complications: None    Specimens:   ID Type Source Tests Collected by Time Destination   A : RIGHT BREAST. SHORT STITCH SUPERIOR MARGIN, LONG STITCH LATERAL MARGIN, INK ANTERIOR MARGIN Tissue Breast SURGICAL PATHOLOGY Aurea Barry MD 7/25/2024 1336    B : LEFT BREAST. SHORT STITCH SUPERIOR MARGIN, LONG STITCH LATERAL MARGIN, IN ANTERIOR MARGIN Tissue Breast SURGICAL PATHOLOGY Aurea Barry MD 7/25/2024 1419        Implants:  Implant Name Type Inv. Item Serial No.  Lot No. LRB No. Used Action   EXPANDER TISS 550CC [facility] CPX4 PLUS - [id number]  EXPANDER TISS 550CC [facility] CPX4 PLUS [id number] [employer] [id number] Right 1 Implanted   TISSUE ALLODERM SELECT [id number] - [id number] Bone/Graft/Tissue/Human/Synth TISSUE ALLODERM SELECT [id number]  [employer] [facility] [id number] Right 1 Implanted   TISSUE ALLODERM SELECT [id number] - [id number] Bone/Graft/Tissue/Human/Synth TISSUE ALLODERM SELECT [id number]  [employer] [facility] [id number] Left 1 Implanted   EXPANDER TISS 550CC [facility] CPX4 PLUS - [id number]  EXPANDER TISS 550CC [facility] CPX4 PLUS [id number] [employer] [id number] Left 1 Implanted         Drains:   Closed/Suction Drain Lateral RUQ Bulb (Active)   Site Description Clean, dry & intact 07/25/24 1811   Dressing Status Clean, dry & intact 07/25/24 1811   Drainage Appearance Bloody 07/25/24 1811   Drain Status Clamped 07/25/24 1811

## 2024-07-25 NOTE — ANESTHESIA PROCEDURE NOTES
Peripheral Block    Patient location during procedure: OR  Reason for block: post-op pain management and at surgeon's request  Start time: 7/25/2024 12:27 PM  End time: 7/25/2024 12:45 PM  Staffing  Performed: anesthesiologist   Anesthesiologist: Love Dash MD  Performed by: Love Dash MD  Authorized by: Love Dash MD    Preanesthetic Checklist  Completed: patient identified, IV checked, site marked, risks and benefits discussed, surgical/procedural consents, equipment checked, pre-op evaluation, timeout performed, anesthesia consent given, oxygen available, monitors applied/VS acknowledged, fire risk safety assessment completed and verbalized and blood product R/B/A discussed and consented  Peripheral Block   Patient position: supine  Prep: ChloraPrep  Provider prep: mask and sterile gloves  Patient monitoring: cardiac monitor, continuous pulse ox, IV access, oxygen and continuous capnometry  Block type: PECS I and PECS II  Laterality: bilateral  Injection technique: single-shot  Guidance: ultrasound guided    Needle   Needle type: insulated echogenic nerve stimulator needle   Needle localization: ultrasound guidance  Assessment   Injection assessment: negative aspiration for heme, no paresthesia on injection, local visualized surrounding nerve on ultrasound and no intravascular symptoms  Slow fractionated injection: yes  Hemodynamics: stable  Outcomes: uncomplicated and patient tolerated procedure well    Additional Notes  20 cc of 0.2% PF Ropivicaine for left PECS2 block    20 cc of 0.2% PF Ropivicaine for right PECS2 block    10 cc of 0.2% PF Ropivicaine for left PECS1 block    10 cc of 0.2% PF Ropivicaine for right PECS1 block  Medications Administered  ropivacaine (NAROPIN) injection 0.2% - Perineural   60 mL - 7/25/2024 12:27:00 PM

## 2024-07-25 NOTE — DISCHARGE INSTRUCTIONS
together. Then pull them along the tube toward the bag. This will help push any clogged fluid through the tube. This is called \"stripping the tube.\" You may find it helpful to hold an alcohol swab between your fingers and the tube to lubricate the tubing.  If the tube still does not drain, call your healthcare provider.       DRAIN CARE INSTRUCTIONS AND LOG  Drain Care      Drain care is required 3 times per day immediately following surgery. Follow these instructions explicitly and record drains as required below. You must complete this 3 times per day, and at any time the drainage bag is more than 1/3 full. You will be required to submit the drain log to Zahira Trevino MD for your patient record.   1.Wash your hands well with anti-bacterial soap.   2.  Strip the drain holding it at the base of the is not pulled out.  You must strip.  Drain at least 3 times a day if not more to prevent clots forming in the tubing.  3. Open cap on the drain bulb. Pour out drainage into a clean measuring cup. Record the amount of drainage and time of day as indicated. Dispose of drainage in the toilet and flush.  4. Squeeze bulbs tight. Replace cap.  Once you have measured your fluid drainage, you must “milk” or strip the drain tubing. This is done to prevent small clots from blocking fluid flow.  To do this, hold the tubing securely at the skin site with one hand. With the other hand, pinch the tubing between your thumb and index finger and apply firm pressure as you strip the tubing towards the bulb.  If the tubing and bulb come apart, wipe the ends with alcohol and reconnect. Squeeze the bulb again and replace cap.      Cleansing the Drain Site  If you have Biopatch in place keep that intact      Additional Instruction  Always Secure the drain to your clothing so that there is no tension on the drain at the incision site  Do not cut the drains  Keep tubes connected to the bulbs  Check that the bulb is always deflated (or

## 2024-07-25 NOTE — BRIEF OP NOTE
Brief Postoperative Note      Patient: Virginie Mejia  YOB: 1985  MRN: [id number]    Date of Procedure: 7/25/2024    Pre-Op Diagnosis Codes:     * Abnormal MRI, breast [R92.8]  High risk for breast cancer    Post-Op Diagnosis: Same       Procedure(s):  BREAST MASTECTOMY BILATERAL NIPPLE SPARING  BILATERAL TISSUE EXPANDER PLACEMENT, BIOPATCH AROUND ANABEL, ADI INCISIONAL BREAST VAC, SERATUS FLAP, PECT BLOCK    Surgeon(s):  Zahira Trevino MD Bland, Keiva, MD    Assistant:  Surgical Assistant: Autumn Law    Anesthesia: General    Estimated Blood Loss (mL): 500    Complications: None    Specimens:   ID Type Source Tests Collected by Time Destination   A : RIGHT BREAST. SHORT STITCH SUPERIOR MARGIN, LONG STITCH LATERAL MARGIN, INK ANTERIOR MARGIN Tissue Breast SURGICAL PATHOLOGY Aurea Barry MD 7/25/2024 1336    B : LEFT BREAST. SHORT STITCH SUPERIOR MARGIN, LONG STITCH LATERAL MARGIN, IN ANTERIOR MARGIN Tissue Breast SURGICAL PATHOLOGY Aurea Barry MD 7/25/2024 1419        Implants:  Implant Name Type Inv. Item Serial No.  Lot No. LRB No. Used Action   EXPANDER TISS 550CC [facility] CPX4 PLUS - [id number]  EXPANDER TISS 550CC [facility] CPX4 PLUS [id number] [employer] [id number] Right 1 Implanted   TISSUE ALLODERM SELECT [id number] - [id number] Bone/Graft/Tissue/Human/Synth TISSUE ALLODERM SELECT [id number]  [employer] [id number] Right 1 Implanted   TISSUE ALLODERM SELECT [id number] - [id number] Bone/Graft/Tissue/Human/Synth TISSUE ALLODERM SELECT [id number]  [employer] [facility] [id number] Left 1 Implanted         Drains: * No LDAs found *    Findings:  Infection Present At Time Of Surgery (PATOS) (choose all levels that have infection present):  No infection present  Other Findings: none  This procedure was not performed to treat cancer       Electronically signed by Aurea Barry MD on 7/25/2024 at 3:13 PM

## 2024-07-25 NOTE — ANESTHESIA POSTPROCEDURE EVALUATION
Department of Anesthesiology  Postprocedure Note    Patient: Virginie Mejia  MRN: [id number]  YOB: 1985  Date of evaluation: 7/25/2024    Procedure Summary       Date: 07/25/24 Room / Location: [address]    Anesthesia Start: 1208 Anesthesia Stop: 1603    Procedures:       BREAST MASTECTOMY BILATERAL NIPPLE SPARING (Bilateral: Breast)      BILATERAL TISSUE EXPANDER PLACEMENT, BIOPATCH AROUND ANABEL, ADI INCISIONAL BREAST VAC, SERATUS FLAP, PECT BLOCK (Bilateral: Breast) Diagnosis:       Abnormal MRI, breast      (Abnormal MRI, breast [R92.8])    Surgeons: Aurea Barry MD; Zahira Trevino MD Responsible Provider: Love Dash MD    Anesthesia Type: General ASA Status: 2            Anesthesia Type: General    Selwyn Phase I: Selwyn Score: 9    Selwyn Phase II:      Anesthesia Post Evaluation    Patient location during evaluation: PACU  Patient participation: complete - patient participated  Level of consciousness: awake and alert  Airway patency: patent  Nausea & Vomiting: no nausea and no vomiting  Cardiovascular status: hemodynamically stable  Respiratory status: acceptable  Hydration status: euvolemic  Pain management: adequate    No notable events documented.

## 2024-07-25 NOTE — H&P
[facility]  [address]  [facility] [address]       History and Physical     No chief complaint on file.       HPI:   Virginie Mejia is a 39 y.o. female who presents to discuss her options regarding possibly considering bilateral mastectomies.  Patient is at high risk for breast cancer.  Patient has a strong family history of breast cancer.  Patient also has a family history of pancreatic cancer and prostate cancer..    Medications:     No current facility-administered medications for this encounter.      Allergies:     Allergies   Allergen Reactions    Cephalosporins Anaphylaxis     In childhood    Ceclor [Cefaclor]      Review of Systems:   Constitutional: Negative for fever, chills, fatigue and unexpected weight change.   HENT: Negative for hearing loss, sore throat and facial swelling.    Eyes: Negative for pain and discharge.   Respiratory: Negative for cough and shortness of breath.    Cardiovascular: Negative for chest pain.   Gastrointestinal: Negative for nausea, vomiting, diarrhea and constipation.   Skin: Negative for pallor and rash.   Neurological: Negative for seizures, syncope and numbness.   Hematological: Does not bruise/bleed easily.   Psychiatric/Behavioral: Patient has a history of anxiety and depression.    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

## 2024-07-26 VITALS
SYSTOLIC BLOOD PRESSURE: 88 MMHG | HEART RATE: 64 BPM | RESPIRATION RATE: 18 BRPM | WEIGHT: 175 LBS | TEMPERATURE: 97.7 F | DIASTOLIC BLOOD PRESSURE: 52 MMHG | BODY MASS INDEX: 27.47 KG/M2 | HEIGHT: 67 IN | OXYGEN SATURATION: 98 %

## 2024-07-26 PROCEDURE — 2500000003 HC RX 250 WO HCPCS: Performed by: SURGERY

## 2024-07-26 PROCEDURE — 94761 N-INVAS EAR/PLS OXIMETRY MLT: CPT

## 2024-07-26 PROCEDURE — 6370000000 HC RX 637 (ALT 250 FOR IP): Performed by: SURGERY

## 2024-07-26 RX ORDER — BACLOFEN 10 MG/1
10 TABLET ORAL 3 TIMES DAILY PRN
Qty: 20 TABLET | Refills: 0 | Status: SHIPPED | OUTPATIENT
Start: 2024-07-26

## 2024-07-26 RX ORDER — GABAPENTIN 100 MG/1
100 CAPSULE ORAL 3 TIMES DAILY
Qty: 30 CAPSULE | Refills: 0 | Status: SHIPPED | OUTPATIENT
Start: 2024-07-26 | End: 2024-08-05

## 2024-07-26 RX ORDER — HYDROCODONE BITARTRATE AND ACETAMINOPHEN 5; 325 MG/1; MG/1
1 TABLET ORAL EVERY 6 HOURS PRN
Qty: 10 TABLET | Refills: 0 | Status: SHIPPED | OUTPATIENT
Start: 2024-07-26 | End: 2024-07-29

## 2024-07-26 RX ORDER — SULFAMETHOXAZOLE AND TRIMETHOPRIM 800; 160 MG/1; MG/1
1 TABLET ORAL 2 TIMES DAILY
Qty: 14 TABLET | Refills: 0 | Status: SHIPPED | OUTPATIENT
Start: 2024-07-26 | End: 2024-08-02

## 2024-07-26 RX ADMIN — OXYCODONE HYDROCHLORIDE 5 MG: 5 TABLET ORAL at 08:39

## 2024-07-26 RX ADMIN — BUSPIRONE HYDROCHLORIDE 15 MG: 10 TABLET ORAL at 08:30

## 2024-07-26 RX ADMIN — ACETAMINOPHEN 650 MG: 325 TABLET ORAL at 06:28

## 2024-07-26 RX ADMIN — OXYCODONE HYDROCHLORIDE 10 MG: 5 TABLET ORAL at 04:18

## 2024-07-26 RX ADMIN — ACETAMINOPHEN 650 MG: 325 TABLET ORAL at 13:01

## 2024-07-26 RX ADMIN — FLUOXETINE HYDROCHLORIDE 20 MG: 20 CAPSULE ORAL at 08:30

## 2024-07-26 RX ADMIN — ACETAMINOPHEN 650 MG: 325 TABLET ORAL at 00:09

## 2024-07-26 RX ADMIN — SPIRONOLACTONE 100 MG: 100 TABLET ORAL at 08:30

## 2024-07-26 RX ADMIN — POTASSIUM CHLORIDE, DEXTROSE MONOHYDRATE AND SODIUM CHLORIDE: 150; 5; 450 INJECTION, SOLUTION INTRAVENOUS at 08:29

## 2024-07-26 RX ADMIN — BISACODYL 5 MG: 5 TABLET, COATED ORAL at 08:30

## 2024-07-26 ASSESSMENT — PAIN DESCRIPTION - DESCRIPTORS
DESCRIPTORS: THROBBING
DESCRIPTORS: ACHING
DESCRIPTORS: THROBBING
DESCRIPTORS: THROBBING

## 2024-07-26 ASSESSMENT — PAIN SCALES - GENERAL
PAINLEVEL_OUTOF10: 0
PAINLEVEL_OUTOF10: 0
PAINLEVEL_OUTOF10: 2
PAINLEVEL_OUTOF10: 4
PAINLEVEL_OUTOF10: 2
PAINLEVEL_OUTOF10: 1
PAINLEVEL_OUTOF10: 3

## 2024-07-26 ASSESSMENT — PAIN DESCRIPTION - LOCATION
LOCATION: CHEST
LOCATION: BREAST
LOCATION: CHEST
LOCATION: CHEST

## 2024-07-26 ASSESSMENT — PAIN DESCRIPTION - ORIENTATION
ORIENTATION: RIGHT;LEFT
ORIENTATION: RIGHT;LEFT
ORIENTATION: LEFT;RIGHT
ORIENTATION: RIGHT;LEFT

## 2024-07-26 ASSESSMENT — PAIN - FUNCTIONAL ASSESSMENT
PAIN_FUNCTIONAL_ASSESSMENT: ACTIVITIES ARE NOT PREVENTED

## 2024-07-26 NOTE — PROGRESS NOTES
This writer went over AVS with patient and spouse, all questions answered at this time. Patient verbalized understanding of all post op care and follow up appointments. Patient spouse demonstrated ANABEL care. Patient was given extra CHG soap. Patient had scripts delivered meds to bed. Patient is leaving via wheelchair to private transportation with all personal belongings. Patient stable at time of discharge.

## 2024-07-26 NOTE — PROGRESS NOTES
End Of Shift Note  [facility]  Summary of shift: the patient rested well through the night. She had her spouse at bedside as well. She had some pain and was medicated and stated that her pain decreased. Her drains were emptying well. She had a total of 150ml from he right and 75ml from the left both were bright red. At the time of this note the patient was in bed with call light within reach and all cedric have been met.     Vitals:    Vitals:    07/26/24 0407 07/26/24 0418 07/26/24 0448 07/26/24 0543   BP: (!) 87/46   (!) 96/48   Pulse: 69   68   Resp: 16 16 16 16   Temp: 97.7 °F (36.5 °C)   97.9 °F (36.6 °C)   TempSrc: Oral   Oral   SpO2: 97%   98%   Weight:       Height:            I&O:   Intake/Output Summary (Last 24 hours) at 7/26/2024 0636  Last data filed at 7/26/2024 0631  Gross per 24 hour   Intake 4884.03 ml   Output 1535 ml   Net 3349.03 ml       Resp Status: room air    Ventilator Settings:     / / /     Critical Care IV infusions:   dextrose 5% and 0.45% NaCl with KCl 20 mEq 100 mL/hr at 07/26/24 0631        LDA:   Peripheral IV 07/25/24 Posterior;Right Hand (Active)   Number of days: 0       Closed/Suction Drain Lateral RUQ Bulb (Active)   Number of days: 1       Closed/Suction Drain Lateral LUQ Bulb (Active)   Number of days: 1       Incision 07/25/24 Breast Right (Active)   Number of days: 0       Incision 07/25/24 Breast Left;Lower (Active)   Number of days: 1

## 2024-07-26 NOTE — PLAN OF CARE
Problem: Discharge Planning  Goal: Discharge to home or other facility with appropriate resources  7/26/2024 1222 by Ashley Chand RN  Outcome: Adequate for Discharge  7/26/2024 1222 by Ashley Chand RN  Outcome: Progressing  Flowsheets  Taken 7/26/2024 1222  Discharge to home or other facility with appropriate resources: Identify barriers to discharge with patient and caregiver  Taken 7/26/2024 0845  Discharge to home or other facility with appropriate resources: Identify barriers to discharge with patient and caregiver     Problem: Pain  Goal: Verbalizes/displays adequate comfort level or baseline comfort level  7/26/2024 1222 by Ashley Cahnd RN  Outcome: Adequate for Discharge  7/26/2024 1222 by Ashley Chand RN  Outcome: Progressing  Flowsheets (Taken 7/26/2024 1222)  Verbalizes/displays adequate comfort level or baseline comfort level: Encourage patient to monitor pain and request assistance     Problem: ABCDS Injury Assessment  Goal: Absence of physical injury  7/26/2024 1222 by Ashley Chand RN  Outcome: Adequate for Discharge  7/26/2024 1222 by Ashley Chand RN  Outcome: Progressing  Flowsheets (Taken 7/26/2024 1222)  Absence of Physical Injury: Implement safety measures based on patient assessment

## 2024-07-26 NOTE — PLAN OF CARE
Problem: Discharge Planning  Goal: Discharge to home or other facility with appropriate resources  Outcome: Progressing  Flowsheets  Taken 7/26/2024 1222  Discharge to home or other facility with appropriate resources: Identify barriers to discharge with patient and caregiver  Taken 7/26/2024 0845  Discharge to home or other facility with appropriate resources: Identify barriers to discharge with patient and caregiver     Problem: Pain  Goal: Verbalizes/displays adequate comfort level or baseline comfort level  Outcome: Progressing  Flowsheets (Taken 7/26/2024 1222)  Verbalizes/displays adequate comfort level or baseline comfort level: Encourage patient to monitor pain and request assistance     Problem: ABCDS Injury Assessment  Goal: Absence of physical injury  Outcome: Progressing  Flowsheets (Taken 7/26/2024 1222)  Absence of Physical Injury: Implement safety measures based on patient assessment

## 2024-07-26 NOTE — PROGRESS NOTES
This writer went over ANABEL care with patient and spouse. Patient and spouse verbalized understanding. Spouse demonstrated for this writer how to empty drains. Preevna wound vac was discussed as well. All questions about drains and dressing were answered at this time.

## 2024-07-26 NOTE — PROGRESS NOTES
POD 1 s/p bilateral nipple sparing mastectomies with implant based reconstruction    S: Pt has no complaints at this time. Overnight was uneventful.    O:   Vitals:    07/26/24 0448 07/26/24 0543 07/26/24 0830 07/26/24 0839   BP:  (!) 96/48 (!) 98/57    Pulse:  68     Resp: 16 16  18   Temp:  97.9 °F (36.6 °C)     TempSrc:  Oral     SpO2:  98%     Weight:       Height:          I/O last 3 completed shifts:  In: 4884 [P.O.:110; I.V.:4774]  Out: 1535 [Urine:400; Drains:335; Blood:800]  No intake/output data recorded.     The bilateral wound vacs are in place. Drains are serosanguinous.    A/P:   POD 1 s/p bilateral nipple sparing mastectomies with implant based reconstruction  ANABEL drain teaching.  Discharge home today.

## 2024-07-26 NOTE — PROGRESS NOTES
CLINICAL PHARMACY NOTE: MEDS TO BEDS    Total # of Prescriptions Filled: 4   The following medications were delivered to the patient:  Gabapentin 100 mg  Baclofen 10 mg  Sulfamethoxazole-trime. 800-160 mg  Hydrocodone-acetaminophen 5-325 mg    Additional Documentation:   Delivered to pt + spouse and RN. Pt had no copay.

## 2024-07-29 DIAGNOSIS — Z98.890 POSTOPERATIVE STATE: Primary | ICD-10-CM

## 2024-07-29 RX ORDER — ONDANSETRON 4 MG/1
4 TABLET, FILM COATED ORAL EVERY 8 HOURS PRN
Qty: 10 TABLET | Refills: 0 | Status: CANCELLED | OUTPATIENT
Start: 2024-07-29 | End: 2024-08-03

## 2024-07-29 RX ORDER — ONDANSETRON 4 MG/1
4 TABLET, ORALLY DISINTEGRATING ORAL 3 TIMES DAILY PRN
Qty: 21 TABLET | Refills: 0 | Status: SHIPPED | OUTPATIENT
Start: 2024-07-29

## 2024-07-29 NOTE — PROGRESS NOTES
Patient call request a refill on her Zofran patient state the patch that was giving at surgery is not longer working. Please advise

## 2024-07-30 LAB — SURGICAL PATHOLOGY REPORT: NORMAL

## 2024-07-31 ENCOUNTER — OFFICE VISIT (OUTPATIENT)
Dept: SURGERY | Age: 39
End: 2024-07-31

## 2024-07-31 VITALS
DIASTOLIC BLOOD PRESSURE: 65 MMHG | HEIGHT: 67 IN | WEIGHT: 175 LBS | HEART RATE: 97 BPM | SYSTOLIC BLOOD PRESSURE: 116 MMHG | BODY MASS INDEX: 27.47 KG/M2

## 2024-07-31 DIAGNOSIS — Z98.890 POSTOPERATIVE STATE: Primary | ICD-10-CM

## 2024-07-31 PROCEDURE — 99024 POSTOP FOLLOW-UP VISIT: CPT | Performed by: PLASTIC SURGERY

## 2024-07-31 NOTE — PROGRESS NOTES
[facility]  [address] [facility] [address]       OFFICE POST-OP NOTE    Patient Name:  Virginie Mejia    :  1985    MRN:  [id number]  STATUS POST  Chief Complaint   Patient presents with    Post-Op Check     Bilateral TE placement DOS: 2024       SUBJECTIVE  Patient seen and examined.  Patient is s/p bilateral nipple-sparing breast mastectomy, bilateral tissue expander placement, biopatch around ANABEL, rex incisional breast vac, and pect block performed on 2024. Breast weight on the right was 535 g and breast weight on the left was 535 g. 550 cc expander was placed on the right and 550 cc expander was placed on the left. She reports that she is is itchy from the adhesive along her chest and R arm. Drain output was too significant at this time to allow for drain removal.      PHYSICAL EXAM  Vital Signs:  /65   Pulse 97   Ht 1.702 m (5' 7\")   Wt 79.4 kg (175 lb)   BMI 27.41 kg/m²     Incisions:  Suture line clean dry and intact. Covered with wound vac.   Skin:  No evidence of infection.   Neurologic:  Alert & oriented x 3.    ASSESSMENT   Diagnosis Orders   1. Postoperative state            PLAN  1.  Drains to remain in place.  Continue drain care  2. Patient advised to apply OTC hydrocortisone cream or benadryl cream to alleviate the itch.  3. Follow up in 1 week.     Plan discussed with patient.    Electronically signed by:  STEPHANY COSTELLO M.D.   2024

## 2024-08-07 ENCOUNTER — OFFICE VISIT (OUTPATIENT)
Dept: SURGERY | Age: 39
End: 2024-08-07

## 2024-08-07 VITALS
DIASTOLIC BLOOD PRESSURE: 70 MMHG | OXYGEN SATURATION: 99 % | HEART RATE: 78 BPM | SYSTOLIC BLOOD PRESSURE: 97 MMHG | BODY MASS INDEX: 27.47 KG/M2 | HEIGHT: 67 IN | WEIGHT: 175 LBS

## 2024-08-07 VITALS — OXYGEN SATURATION: 100 % | RESPIRATION RATE: 16 BRPM | BODY MASS INDEX: 27.47 KG/M2 | WEIGHT: 175 LBS | HEIGHT: 67 IN

## 2024-08-07 DIAGNOSIS — Z98.890 POSTOPERATIVE STATE: Primary | ICD-10-CM

## 2024-08-07 DIAGNOSIS — Z80.3 FAMILY HISTORY OF BREAST CANCER: ICD-10-CM

## 2024-08-07 PROCEDURE — 99024 POSTOP FOLLOW-UP VISIT: CPT | Performed by: SURGERY

## 2024-08-07 PROCEDURE — 99024 POSTOP FOLLOW-UP VISIT: CPT | Performed by: PLASTIC SURGERY

## 2024-08-07 RX ORDER — GABAPENTIN 100 MG/1
100 CAPSULE ORAL 3 TIMES DAILY
Qty: 90 CAPSULE | Refills: 0 | Status: SHIPPED | OUTPATIENT
Start: 2024-08-07 | End: 2024-09-06

## 2024-08-07 NOTE — PROGRESS NOTES
[facility]ANS [facility] SUR[facility]ICAL SPECIALISTS  [address] [facility] [address]       OFFICE POST-OP NOTE    Patient Name:  Virginie Mejia    :  1985    MRN:  [id number]  STATUS POST  Chief Complaint   Patient presents with    Post-Op Check      Bilateral tissue expander placement DOS 24         SUBJECTIVE  Patient seen and examined.  Patient is s/p bilateral nipple-sparing breast mastectomy, bilateral tissue expander placement, biopatch around ANABEL, rex incisional breast vac, and pect block performed on 2024. Breast weight on the right was 535 g and breast weight on the left was 535 g. 550 cc expander was placed on the right and 550 cc expander was placed on the left. She reports that she is is itchy from the adhesive along her chest and R arm.     PHYSICAL EXAM  Vital Signs:  Resp 16   Ht 1.702 m (5' 7\")   Wt 79.4 kg (175 lb)   SpO2 100%   BMI 27.41 kg/m²     Incisions: Suture line is intact.  Patient is healing well.  Skin:  No evidence of infection.   Neurologic:  Alert & oriented x 3.  Drain: Patient is putting serosanguineous drainage.  2 days ago was 50-60 for the past 2 days it has been 30 or less.  ASSESSMENT   Diagnosis Orders   1. Postoperative state            PLAN  Continue drain care  Will put Mastisol and Steri-Strip  Patient is to follow-up in 1 week for drain removal and possible expansion.    Plan discussed with patient.    Electronically signed by:  STEPHANY COSTELLO M.D.   2024

## 2024-08-07 NOTE — PATIENT INSTRUCTIONS
Pathology is benign and was reviewed.  Discussed follow-up which will consist of physical exam and symptomatic management   RTC 6 months.  Gabapentin refilled for 30 days for nerve pain.

## 2024-08-07 NOTE — PROGRESS NOTES
Review of Systems   Constitutional:  Positive for activity change, fatigue and unexpected weight change. Negative for appetite change, chills, diaphoresis and fever.   Respiratory:  Negative for apnea, cough, chest tightness, shortness of breath, wheezing and stridor.    Cardiovascular:  Negative for chest pain and leg swelling.   Gastrointestinal:  Positive for abdominal distention, abdominal pain, blood in stool (Black stool) and constipation. Negative for anal bleeding, diarrhea, nausea, rectal pain and vomiting.   Genitourinary:  Positive for flank pain. Negative for difficulty urinating, dysuria, enuresis, frequency, hematuria and urgency.   Musculoskeletal:  Negative for back pain.   Skin:  Negative for color change and pallor.   Allergic/Immunologic: Negative for food allergies and immunocompromised state.   Neurological:  Positive for weakness. Negative for syncope, speech difficulty, light-headedness, numbness and headaches.   Hematological:  Negative for adenopathy. Does not bruise/bleed easily.   Psychiatric/Behavioral:  The patient is not nervous/anxious.      
[facility]    Hunger Screening     Within the past 12 months we worried whether our food would run out before we got money to buy more.: Never True     Within the past 12 months the food we bought just didn't last and we didn't have money to get more.: Never True   Transportation Needs: Not on file   Physical Activity: Not on file   Stress: Not on file   Social Connections: Not on file   Intimate Partner Violence: Not on file   Housing Stability: Not on file       Review of Systems:   Constitutional:  Negative for chills, fatigue, fever and unexpected weight change.   Eyes:  Negative for visual disturbance.   Respiratory:  Negative for cough, chest tightness, shortness of breath and wheezing.    Cardiovascular:  Negative for chest pain, palpitations and leg swelling.   Gastrointestinal:  Negative for abdominal distention, abdominal pain, blood in stool, constipation, diarrhea, nausea and vomiting.   Genitourinary:  Negative for dysuria, hematuria and urgency.   Musculoskeletal:  Negative for back pain, neck pain and neck stiffness.   Skin:  Negative for rash and wound.   Neurological:  Negative for syncope, weakness, light-headedness and headaches.   Hematological:  Negative for adenopathy. Does not bruise/bleed easily.   Psychiatric/Behavioral:  Negative for suicidal ideas. The patient is not nervous/anxious.      Physical Exam:  Vitals:    08/07/24 1021   BP: 97/70   Pulse: 78   SpO2: 99%     General:A & O x3  BREAST: She has a Prevena negative pressure dressing is in place.  There is no drainage on the Prevena dressing.  The exposed skin color looks normal.      IMAGING:  Mammogram Result (most recent):  No results found for this or any previous visit from the past 3650 days.     Ultrasound Result (most recent):  US LEFT BREAST LIMITED 06/12/2024    Narrative  EXAMINATION:  TARGETED ULTRASOUND OF THE LEFT BREAST    6/12/2024    COMPARISON:  May 22, 2024 MRI    HISTORY:  ORDERING SYSTEM PROVIDED

## 2024-08-12 ENCOUNTER — TELEPHONE (OUTPATIENT)
Dept: SURGERY | Age: 39
End: 2024-08-12

## 2024-08-12 NOTE — TELEPHONE ENCOUNTER
Patient call office concern about one of her drain not holing suction, patient report her drain out out was 30 mL prior. Writer offer a nurse visit today 8/12/2024 pt decline state she will follow up with provider 8/13/24.

## 2024-08-13 ENCOUNTER — OFFICE VISIT (OUTPATIENT)
Dept: SURGERY | Age: 39
End: 2024-08-13

## 2024-08-13 VITALS
HEART RATE: 76 BPM | DIASTOLIC BLOOD PRESSURE: 94 MMHG | WEIGHT: 176.8 LBS | SYSTOLIC BLOOD PRESSURE: 129 MMHG | BODY MASS INDEX: 27.75 KG/M2 | HEIGHT: 67 IN | OXYGEN SATURATION: 97 %

## 2024-08-13 DIAGNOSIS — Z98.890 POSTOPERATIVE STATE: Primary | ICD-10-CM

## 2024-08-13 PROCEDURE — 99024 POSTOP FOLLOW-UP VISIT: CPT | Performed by: PLASTIC SURGERY

## 2024-08-13 NOTE — PROGRESS NOTES
[facility] SUR[facility]ICAL SPECIALISTS  [address] [facility] [address]       OFFICE POST-OP NOTE    Patient Name:  Virginie Mejia    :  1985    MRN:  [id number]  STATUS POST  Chief Complaint   Patient presents with    Post-Op Check     Bilateral tissue expander placement DOS 24       SUBJECTIVE  Patient seen and examined.  Patient is s/p bilateral nipple-sparing breast mastectomy, bilateral tissue expander placement, biopatch around ANABEL, rex incisional breast vac, and pect block performed on 2024. Breast weight on the right was 535 g and breast weight on the left was 535 g. 550 cc expander was placed on the right and 550 cc expander was placed on the left. She reports that she is is itchy from the adhesive along her chest and R arm.     PHYSICAL EXAM  Vital Signs:  There were no vitals taken for this visit.    Incisions: Suture line is intact.  Patient is healing well.  Skin:  No evidence of infection.   Neurologic:  Alert & oriented x 3.  Drain: The left drain is putting out less than 30 cc/day and the right drain is putting out greater than 30 cc/day.  ASSESSMENT   Diagnosis Orders   1. Postoperative state            PLAN  Continue drain care on the right side  We will remove the left drain  Will put Mastisol and Steri-Strip  Patient is to follow-up in 1 week for drain removal and possible expansion.    Plan discussed with patient.    Electronically signed by:  STEPHANY COSTELLO M.D.   2024

## 2024-08-21 ENCOUNTER — OFFICE VISIT (OUTPATIENT)
Dept: SURGERY | Age: 39
End: 2024-08-21

## 2024-08-21 VITALS
HEART RATE: 77 BPM | BODY MASS INDEX: 27.78 KG/M2 | SYSTOLIC BLOOD PRESSURE: 109 MMHG | RESPIRATION RATE: 16 BRPM | WEIGHT: 177 LBS | DIASTOLIC BLOOD PRESSURE: 60 MMHG | HEIGHT: 67 IN

## 2024-08-21 DIAGNOSIS — Z98.890 POSTOPERATIVE STATE: Primary | ICD-10-CM

## 2024-08-21 PROCEDURE — 99024 POSTOP FOLLOW-UP VISIT: CPT | Performed by: PLASTIC SURGERY

## 2024-08-21 NOTE — PROGRESS NOTES
[facility], [facility] SURGICAL SPECIALISTS  [address] [facility] [address]       Chief Complaint:  Expansions: Amount injected in the OR on 7/25/2024 was 0.        S/P:  Tissue expander placement on 7/25/2024  Breast weight: Right   535                 Breast weight left:       535                  HPI:  Patient is status post bilateral mastectomy.  Patient status post bilateral expander placement.  Patient has 550 cc expanders in place.  Patient with a history of breast cancer.  Patient now presents for further expansion.        Drain output every 24 hours: Lt  Rt  Back  Last 24 hrs   The drain is putting out less than 30 cc.  All areas were prepped and draped in the usual customary sterile manner using:                     betadine                      The port was localized using Center scope.      Amount injected today:    RIGHT: 300 cc       LEFT: 300 cc   Total volume:                     RIGHT: 300 cc       LEFT: 300 cc     Physical exam:    /60   Pulse 77   Resp 16   Ht 1.702 m (5' 7\")   Wt 80.3 kg (177 lb)   BMI 27.72 kg/m²     HEENT: Head is atraumatic normocephalic extraocular muscles are grossly intact, external ears are normal limits neck is supple.   Skin: There is no evidence of infection.   Extremities: good range of motion, no lymph edema noted.     Follow up:  Chemotherapy:    YES   NO            Radiation:  YES   NO    Oncologist:    Plan:  Continue expansion.  Followup in 1 week    Electronically Signed By:  STEPHANY COSTELLO M.D.  8/21/2024

## 2024-08-28 ENCOUNTER — OFFICE VISIT (OUTPATIENT)
Dept: SURGERY | Age: 39
End: 2024-08-28

## 2024-08-28 VITALS
SYSTOLIC BLOOD PRESSURE: 106 MMHG | OXYGEN SATURATION: 100 % | BODY MASS INDEX: 26.37 KG/M2 | HEART RATE: 75 BPM | DIASTOLIC BLOOD PRESSURE: 67 MMHG | RESPIRATION RATE: 16 BRPM | WEIGHT: 168 LBS | HEIGHT: 67 IN

## 2024-08-28 DIAGNOSIS — Z98.890 POSTOPERATIVE STATE: Primary | ICD-10-CM

## 2024-08-28 PROCEDURE — 99024 POSTOP FOLLOW-UP VISIT: CPT | Performed by: PLASTIC SURGERY

## 2024-08-28 NOTE — PROGRESS NOTES
[facility], [facility] SURGICAL SPECIALISTS  [address] [address]       Chief Complaint:  Expansions: Amount injected in the OR on 7/25/2024 was 0.        S/P:  Tissue expander placement on 7/25/2024  Breast weight: Right   535                 Breast weight left:       535                  HPI:  Patient is status post bilateral mastectomy.  Patient status post bilateral expander placement.  Patient has 550 cc expanders in place.  Patient with a history of breast cancer.  Patient now presents for further expansion.        Drain output every 24 hours: Lt  Rt  Back  Last 24 hrs   The drain is putting out less than 30 cc.  All areas were prepped and draped in the usual customary sterile manner using:                     betadine                      The port was localized using Center scope.      Amount injected today:    RIGHT: 100 cc       LEFT: 100 cc   Total volume:                     RIGHT: 400 cc       LEFT: 400 cc     Physical exam:    /67 (Site: Left Upper Arm, Position: Sitting, Cuff Size: Small Adult)   Pulse 75   Resp 16   Ht 1.702 m (5' 7\")   Wt 76.2 kg (168 lb)   SpO2 100%   BMI 26.31 kg/m²     HEENT: Head is atraumatic normocephalic extraocular muscles are grossly intact, external ears are normal limits neck is supple.   Skin: There is no evidence of infection.   Extremities: good range of motion, no lymph edema noted.     Follow up:  Chemotherapy:    YES   NO            Radiation:  YES   NO    Oncologist:    Plan:  Continue expansion.  Followup in 1 week    Electronically Signed By:  STEPHANY COSTELLO M.D.  8/28/2024

## 2024-09-04 ENCOUNTER — OFFICE VISIT (OUTPATIENT)
Dept: SURGERY | Age: 39
End: 2024-09-04

## 2024-09-04 VITALS
HEART RATE: 77 BPM | HEIGHT: 67 IN | SYSTOLIC BLOOD PRESSURE: 105 MMHG | DIASTOLIC BLOOD PRESSURE: 70 MMHG | BODY MASS INDEX: 28.41 KG/M2 | WEIGHT: 181 LBS | RESPIRATION RATE: 16 BRPM

## 2024-09-04 DIAGNOSIS — Z98.890 POSTOPERATIVE STATE: Primary | ICD-10-CM

## 2024-09-04 PROCEDURE — 99024 POSTOP FOLLOW-UP VISIT: CPT | Performed by: PLASTIC SURGERY

## 2024-09-04 NOTE — PROGRESS NOTES
[facility], [facility] SURGICAL SPECIALISTS  [address] [address]       Chief Complaint:  Expansions: Amount injected in the OR on 7/25/2024 was 0.        S/P:  Tissue expander placement on 7/25/2024  Breast weight: Right   535                 Breast weight left:       535                  HPI:  Patient is status post bilateral mastectomy.  Patient status post bilateral expander placement.  Patient has 550 cc expanders in place.  Patient with a history of breast cancer.  Patient now presents for further expansion.        All areas were prepped and draped in the usual customary sterile manner using:                     betadine                      The port was localized using Center scope.      Amount injected today:    RIGHT: 150 cc       LEFT: 150 cc   Total volume:                     RIGHT: 550 cc       LEFT: 550 cc     Physical exam:    /70 (Site: Left Upper Arm, Position: Sitting, Cuff Size: Large Adult)   Pulse 77   Resp 16   Ht 1.702 m (5' 7\")   Wt 82.1 kg (181 lb)   BMI 28.35 kg/m²     HEENT: Head is atraumatic normocephalic extraocular muscles are grossly intact, external ears are normal limits neck is supple.   Skin: There is no evidence of infection.   Extremities: good range of motion, no lymph edema noted.     Follow up:  Patient does not need radiation or chemotherapy.       Plan:  Continue expansion.  Followup in 1 month.  Patient will decide regarding the size.  Patient will also decide regarding saline or silicone.    Electronically Signed By:  STEPHANY COSTELLO M.D.  9/4/2024

## 2024-09-12 ENCOUNTER — TELEPHONE (OUTPATIENT)
Dept: SURGERY | Age: 39
End: 2024-09-12

## 2024-09-20 ENCOUNTER — OFFICE VISIT (OUTPATIENT)
Dept: SURGERY | Age: 39
End: 2024-09-20

## 2024-09-20 VITALS
BODY MASS INDEX: 26.37 KG/M2 | HEART RATE: 71 BPM | SYSTOLIC BLOOD PRESSURE: 118 MMHG | OXYGEN SATURATION: 100 % | RESPIRATION RATE: 16 BRPM | DIASTOLIC BLOOD PRESSURE: 78 MMHG | HEIGHT: 67 IN | WEIGHT: 168 LBS

## 2024-09-20 DIAGNOSIS — Z98.890 POSTOPERATIVE STATE: Primary | ICD-10-CM

## 2024-09-20 DIAGNOSIS — Z90.10 STATUS POST MASTECTOMY, UNSPECIFIED LATERALITY: ICD-10-CM

## 2024-09-20 PROCEDURE — 99024 POSTOP FOLLOW-UP VISIT: CPT | Performed by: PLASTIC SURGERY

## 2024-10-01 ENCOUNTER — TELEPHONE (OUTPATIENT)
Dept: SURGERY | Age: 39
End: 2024-10-01

## 2024-10-01 NOTE — TELEPHONE ENCOUNTER
Left voicemail.   Writer call patient to check if she follow with her PCP & if she make an appointment with lymphedema clinic. On regards upper arm swelling

## 2024-10-09 ENCOUNTER — OFFICE VISIT (OUTPATIENT)
Dept: SURGERY | Age: 39
End: 2024-10-09

## 2024-10-09 VITALS
BODY MASS INDEX: 28.25 KG/M2 | SYSTOLIC BLOOD PRESSURE: 119 MMHG | HEART RATE: 76 BPM | DIASTOLIC BLOOD PRESSURE: 78 MMHG | WEIGHT: 180 LBS | OXYGEN SATURATION: 99 % | HEIGHT: 67 IN | RESPIRATION RATE: 16 BRPM

## 2024-10-09 DIAGNOSIS — Z98.890 POSTOPERATIVE STATE: Primary | ICD-10-CM

## 2024-10-09 PROCEDURE — 99024 POSTOP FOLLOW-UP VISIT: CPT | Performed by: PLASTIC SURGERY

## 2024-10-09 NOTE — PROGRESS NOTES
majority of patients do not experience these complications, you should discuss each of them with your plastic surgeon to make sure you understand all possible consequences of breast augmentation.  Problems associated with breast implants can be inherent to this type of implanted medical device or relate to complications of a surgical procedure.  Additional advisory information regarding this subject should be reviewed by patients considering surgery that involves breast implants.    While every patient experiences her own individual risks and benefits following breast implant surgery, clinical data suggests that most women will be satisfied with the outcome of breast implant surgery despite the occurrence of problems inherent with the surgery.    Inherent Risks of Silicone Gel-Filled Breast Implants    Implants- Breast implants, similar to other medical devices, can fail.  When a silicone gel-filled implant ruptures, the gel material is usually contained within the scar tissue surrounding the implant (intracapsular rupture).  In some cases, the gel may escape beyond the capsule layer and go into the breast tissue itself (extracapsular rupture and gel migration).  Rupture of a breast implant may or may not produce local firmness in the breast.  Rupture can occur as a result of an injury, from no apparent cause (silent rupture), or during mammography.  It is also possible to damage an implant at the time of surgery.  Damaged or broken implants cannot be repaired.  Ruptured or damaged implants require replacement or removal.  Breast implants can wear out, they are not guaranteed to last a lifetime and future surgery may be required to replace one or both implants.  A MRI (magnetic resonance imaging) study may be necessary to evaluate the possibility of implant rupture, yet it may not be 100% accurate in diagnosing implant integrity.    Capsular Contracture- Scar tissue, which forms internally around the breast implant,

## 2024-10-29 ENCOUNTER — TELEPHONE (OUTPATIENT)
Dept: SURGERY | Age: 39
End: 2024-10-29

## 2024-10-29 ENCOUNTER — PREP FOR PROCEDURE (OUTPATIENT)
Dept: SURGERY | Age: 39
End: 2024-10-29

## 2024-10-29 DIAGNOSIS — Z85.3 HX: BREAST CANCER: ICD-10-CM

## 2024-10-29 NOTE — TELEPHONE ENCOUNTER
Spoke to patient, surgery scheduled at [facility]. Patient confirmed and information sent to patient(s) faith.    Surgery date/time: 1/23/25 at 9am  Arrival time: 7am  PAT: 1/9/25 at 11am  Post op: 1/29/25 at 10:15am  Post op: 2/5/25 at 10:15am

## 2024-12-06 ENCOUNTER — OFFICE VISIT (OUTPATIENT)
Dept: SURGERY | Age: 39
End: 2024-12-06

## 2024-12-06 VITALS
HEIGHT: 67 IN | RESPIRATION RATE: 16 BRPM | SYSTOLIC BLOOD PRESSURE: 129 MMHG | BODY MASS INDEX: 26.37 KG/M2 | HEART RATE: 90 BPM | DIASTOLIC BLOOD PRESSURE: 76 MMHG | WEIGHT: 168 LBS

## 2024-12-06 DIAGNOSIS — Z85.3 HX: BREAST CANCER: Primary | ICD-10-CM

## 2024-12-06 NOTE — PROGRESS NOTES
infrequently, the risks cited are particularly associated with this surgery.  Other complications and risks can occur but are even more uncommon.  The practice of medicine and surgery is not an exact science.  Although good results are expected, there is no guarantee or warranty expressed or implied, on the results that may be obtained.  In some situations, it may not be possible to achieve optimal results with a single surgical procedure.  You and your surgeon will discuss the options available should additional surgery be advised.  There may be additional costs and expenses for such additional procedures, including surgical fees, facility and anesthesia fees, pathology and lab testing.     PATIENT COMPLIANCE   Follow all physician instructions carefully; this is essential for the success of your outcome.  It is important that the surgical incisions are not subjected to excessive force, swelling, abrasion, or motion during the time of healing.  Personal and vocational activity needs to be restricted.  Protective dressings and drains should not be removed unless instructed by your plastic surgeon.  Successful post-operative function depends on both surgery and subsequent care.  Physical activity that increases your pulse or heart rate may cause bruising, swelling, fluid accumulation and the need for return to surgery.  It is wise to refrain from intimate physical activities after surgery until your physician states it is safe.  It is important that you participate in follow-up care, return for aftercare, and promote your recovery after surgery.     REVISION POLICY  Surgical revision surgery is a common part of elective surgery. Your procedure will not stop you from aging, sagging, scarring, or experiencing ongoing skin changes that are more genetically controlled. If revision surgery is either desired or advisable within one year after the initial surgery, there may be a physician's fee.  Additionally, there may be

## 2024-12-16 ENCOUNTER — HOSPITAL ENCOUNTER (OUTPATIENT)
Age: 39
Discharge: HOME | End: 2024-12-16
Payer: COMMERCIAL

## 2024-12-16 DIAGNOSIS — Z01.818 PRE-OP TESTING: ICD-10-CM

## 2024-12-16 LAB
ERYTHROCYTE [DISTWIDTH] IN BLOOD BY AUTOMATED COUNT: 17 % (ref 11.8–14.4)
HCT VFR BLD AUTO: 40.1 % (ref 36.3–47.1)
HGB BLD-MCNC: 13 G/DL (ref 11.9–15.1)
MCH RBC QN AUTO: 28.6 PG (ref 25.2–33.5)
MCHC RBC AUTO-ENTMCNC: 32.4 G/DL (ref 25.2–33.5)
MCV RBC AUTO: 88.1 FL (ref 82.6–102.9)
NRBC BLD-RTO: 0 PER 100 WBC
PLATELET # BLD AUTO: 305 K/UL (ref 138–453)
PMV BLD AUTO: 10.4 FL (ref 8.1–13.5)
RBC # BLD AUTO: 4.55 M/UL (ref 3.95–5.11)
WBC OTHER # BLD: 8.1 K/UL (ref 3.5–11.3)

## 2024-12-16 PROCEDURE — G0480 DRUG TEST DEF 1-7 CLASSES: HCPCS

## 2024-12-16 PROCEDURE — 36415 COLL VENOUS BLD VENIPUNCTURE: CPT

## 2024-12-16 PROCEDURE — 85027 COMPLETE CBC AUTOMATED: CPT

## 2024-12-23 ENCOUNTER — ANESTHESIA EVENT (OUTPATIENT)
Dept: OPERATING ROOM | Age: 39
End: 2024-12-23
Payer: COMMERCIAL

## 2024-12-26 ENCOUNTER — ANESTHESIA (OUTPATIENT)
Dept: OPERATING ROOM | Age: 39
End: 2024-12-26
Payer: COMMERCIAL

## 2024-12-26 ENCOUNTER — HOSPITAL ENCOUNTER (OUTPATIENT)
Age: 39
Setting detail: OUTPATIENT SURGERY
Discharge: HOME | End: 2024-12-26
Attending: PLASTIC SURGERY | Admitting: PLASTIC SURGERY
Payer: COMMERCIAL

## 2024-12-26 VITALS
OXYGEN SATURATION: 97 % | WEIGHT: 180 LBS | HEIGHT: 67 IN | TEMPERATURE: 97.9 F | SYSTOLIC BLOOD PRESSURE: 112 MMHG | HEART RATE: 77 BPM | RESPIRATION RATE: 19 BRPM | DIASTOLIC BLOOD PRESSURE: 78 MMHG | BODY MASS INDEX: 28.25 KG/M2

## 2024-12-26 DIAGNOSIS — Z85.3 HX: BREAST CANCER: Primary | ICD-10-CM

## 2024-12-26 LAB — HCG, PREGNANCY URINE (POC): NEGATIVE

## 2024-12-26 PROCEDURE — 3600000012 HC SURGERY LEVEL 2 ADDTL 15MIN: Performed by: PLASTIC SURGERY

## 2024-12-26 PROCEDURE — 3700000001 HC ADD 15 MINUTES (ANESTHESIA): Performed by: PLASTIC SURGERY

## 2024-12-26 PROCEDURE — 2500000003 HC RX 250 WO HCPCS: Performed by: PLASTIC SURGERY

## 2024-12-26 PROCEDURE — 88305 TISSUE EXAM BY PATHOLOGIST: CPT

## 2024-12-26 PROCEDURE — 3700000000 HC ANESTHESIA ATTENDED CARE: Performed by: PLASTIC SURGERY

## 2024-12-26 PROCEDURE — C1789 PROSTHESIS, BREAST, IMP: HCPCS | Performed by: PLASTIC SURGERY

## 2024-12-26 PROCEDURE — 2709999900 HC NON-CHARGEABLE SUPPLY: Performed by: PLASTIC SURGERY

## 2024-12-26 PROCEDURE — 6360000002 HC RX W HCPCS: Performed by: ANESTHESIOLOGY

## 2024-12-26 PROCEDURE — 2580000003 HC RX 258: Performed by: ANESTHESIOLOGY

## 2024-12-26 PROCEDURE — 11401 EXC TR-EXT B9+MARG 0.6-1 CM: CPT | Performed by: PLASTIC SURGERY

## 2024-12-26 PROCEDURE — 6360000002 HC RX W HCPCS: Performed by: PLASTIC SURGERY

## 2024-12-26 PROCEDURE — 7100000000 HC PACU RECOVERY - FIRST 15 MIN: Performed by: PLASTIC SURGERY

## 2024-12-26 PROCEDURE — 6370000000 HC RX 637 (ALT 250 FOR IP): Performed by: ANESTHESIOLOGY

## 2024-12-26 PROCEDURE — 76942 ECHO GUIDE FOR BIOPSY: CPT | Performed by: ANESTHESIOLOGY

## 2024-12-26 PROCEDURE — 7100000010 HC PHASE II RECOVERY - FIRST 15 MIN: Performed by: PLASTIC SURGERY

## 2024-12-26 PROCEDURE — 2720000010 HC SURG SUPPLY STERILE: Performed by: PLASTIC SURGERY

## 2024-12-26 PROCEDURE — 2500000003 HC RX 250 WO HCPCS: Performed by: ANESTHESIOLOGY

## 2024-12-26 PROCEDURE — 81025 URINE PREGNANCY TEST: CPT

## 2024-12-26 PROCEDURE — 7100000011 HC PHASE II RECOVERY - ADDTL 15 MIN: Performed by: PLASTIC SURGERY

## 2024-12-26 PROCEDURE — 3600000002 HC SURGERY LEVEL 2 BASE: Performed by: PLASTIC SURGERY

## 2024-12-26 PROCEDURE — 7100000001 HC PACU RECOVERY - ADDTL 15 MIN: Performed by: PLASTIC SURGERY

## 2024-12-26 PROCEDURE — 11970 RPLCMT TISS XPNDR PERM IMPLT: CPT | Performed by: PLASTIC SURGERY

## 2024-12-26 DEVICE — SMOOTH ROUND ULTRA HIGH PROFILE SILICONE GEL-FILLED BREAST IMPLANT, 700CC  SMOOTH ROUND SILICONE
Type: IMPLANTABLE DEVICE | Site: BREAST | Status: FUNCTIONAL
Brand: MENTOR MEMORYGEL BREAST IMPLANT

## 2024-12-26 RX ORDER — DIPHENHYDRAMINE HYDROCHLORIDE 50 MG/ML
INJECTION INTRAMUSCULAR; INTRAVENOUS
Status: DISCONTINUED | OUTPATIENT
Start: 2024-12-26 | End: 2024-12-26 | Stop reason: SDUPTHER

## 2024-12-26 RX ORDER — FENTANYL CITRATE 50 UG/ML
25 INJECTION, SOLUTION INTRAMUSCULAR; INTRAVENOUS EVERY 5 MIN PRN
Status: DISCONTINUED | OUTPATIENT
Start: 2024-12-26 | End: 2024-12-26 | Stop reason: HOSPADM

## 2024-12-26 RX ORDER — PROMETHAZINE HYDROCHLORIDE 12.5 MG/1
25 TABLET ORAL ONCE
Status: COMPLETED | OUTPATIENT
Start: 2024-12-26 | End: 2024-12-26

## 2024-12-26 RX ORDER — ROPIVACAINE HYDROCHLORIDE 2 MG/ML
INJECTION, SOLUTION EPIDURAL; INFILTRATION; PERINEURAL
Status: COMPLETED | OUTPATIENT
Start: 2024-12-26 | End: 2024-12-26

## 2024-12-26 RX ORDER — GABAPENTIN 100 MG/1
100 CAPSULE ORAL 3 TIMES DAILY
Qty: 30 CAPSULE | Refills: 0 | Status: SHIPPED | OUTPATIENT
Start: 2024-12-26 | End: 2025-01-05

## 2024-12-26 RX ORDER — SODIUM CHLORIDE 9 MG/ML
INJECTION, SOLUTION INTRAVENOUS PRN
Status: DISCONTINUED | OUTPATIENT
Start: 2024-12-26 | End: 2024-12-26 | Stop reason: HOSPADM

## 2024-12-26 RX ORDER — LIDOCAINE HYDROCHLORIDE 20 MG/ML
INJECTION, SOLUTION EPIDURAL; INFILTRATION; INTRACAUDAL; PERINEURAL
Status: DISCONTINUED | OUTPATIENT
Start: 2024-12-26 | End: 2024-12-26 | Stop reason: SDUPTHER

## 2024-12-26 RX ORDER — BUPIVACAINE HYDROCHLORIDE 2.5 MG/ML
INJECTION, SOLUTION EPIDURAL; INFILTRATION; INTRACAUDAL
Status: COMPLETED | OUTPATIENT
Start: 2024-12-26 | End: 2024-12-26

## 2024-12-26 RX ORDER — HYDROMORPHONE HYDROCHLORIDE 1 MG/ML
0.5 INJECTION, SOLUTION INTRAMUSCULAR; INTRAVENOUS; SUBCUTANEOUS EVERY 5 MIN PRN
Status: DISCONTINUED | OUTPATIENT
Start: 2024-12-26 | End: 2024-12-26 | Stop reason: HOSPADM

## 2024-12-26 RX ORDER — ROCURONIUM BROMIDE 10 MG/ML
INJECTION, SOLUTION INTRAVENOUS
Status: DISCONTINUED | OUTPATIENT
Start: 2024-12-26 | End: 2024-12-26 | Stop reason: SDUPTHER

## 2024-12-26 RX ORDER — HALOPERIDOL 5 MG/ML
1 INJECTION INTRAMUSCULAR
Status: DISCONTINUED | OUTPATIENT
Start: 2024-12-26 | End: 2024-12-26 | Stop reason: HOSPADM

## 2024-12-26 RX ORDER — NALOXONE HYDROCHLORIDE 0.4 MG/ML
INJECTION, SOLUTION INTRAMUSCULAR; INTRAVENOUS; SUBCUTANEOUS PRN
Status: DISCONTINUED | OUTPATIENT
Start: 2024-12-26 | End: 2024-12-26 | Stop reason: HOSPADM

## 2024-12-26 RX ORDER — SODIUM CHLORIDE, SODIUM LACTATE, POTASSIUM CHLORIDE, CALCIUM CHLORIDE 600; 310; 30; 20 MG/100ML; MG/100ML; MG/100ML; MG/100ML
INJECTION, SOLUTION INTRAVENOUS CONTINUOUS
Status: DISCONTINUED | OUTPATIENT
Start: 2024-12-26 | End: 2024-12-26 | Stop reason: HOSPADM

## 2024-12-26 RX ORDER — MIDAZOLAM HYDROCHLORIDE 1 MG/ML
INJECTION, SOLUTION INTRAMUSCULAR; INTRAVENOUS
Status: DISCONTINUED | OUTPATIENT
Start: 2024-12-26 | End: 2024-12-26 | Stop reason: SDUPTHER

## 2024-12-26 RX ORDER — SODIUM CHLORIDE 0.9 % (FLUSH) 0.9 %
5-40 SYRINGE (ML) INJECTION EVERY 12 HOURS SCHEDULED
Status: DISCONTINUED | OUTPATIENT
Start: 2024-12-26 | End: 2024-12-26 | Stop reason: HOSPADM

## 2024-12-26 RX ORDER — PROPOFOL 10 MG/ML
INJECTION, EMULSION INTRAVENOUS
Status: DISCONTINUED | OUTPATIENT
Start: 2024-12-26 | End: 2024-12-26 | Stop reason: SDUPTHER

## 2024-12-26 RX ORDER — BUPIVACAINE HYDROCHLORIDE 5 MG/ML
INJECTION, SOLUTION EPIDURAL; INTRACAUDAL
Status: COMPLETED | OUTPATIENT
Start: 2024-12-26 | End: 2024-12-26

## 2024-12-26 RX ORDER — METOCLOPRAMIDE HYDROCHLORIDE 5 MG/ML
10 INJECTION INTRAMUSCULAR; INTRAVENOUS
Status: DISCONTINUED | OUTPATIENT
Start: 2024-12-26 | End: 2024-12-26 | Stop reason: HOSPADM

## 2024-12-26 RX ORDER — LIDOCAINE HYDROCHLORIDE 10 MG/ML
1 INJECTION, SOLUTION EPIDURAL; INFILTRATION; INTRACAUDAL; PERINEURAL
Status: DISCONTINUED | OUTPATIENT
Start: 2024-12-26 | End: 2024-12-26 | Stop reason: HOSPADM

## 2024-12-26 RX ORDER — FENTANYL CITRATE 50 UG/ML
INJECTION, SOLUTION INTRAMUSCULAR; INTRAVENOUS
Status: DISCONTINUED | OUTPATIENT
Start: 2024-12-26 | End: 2024-12-26 | Stop reason: SDUPTHER

## 2024-12-26 RX ORDER — SODIUM CHLORIDE 9 MG/ML
INJECTION, SOLUTION INTRAVENOUS
Status: DISCONTINUED | OUTPATIENT
Start: 2024-12-26 | End: 2024-12-26 | Stop reason: SDUPTHER

## 2024-12-26 RX ORDER — HYDROCODONE BITARTRATE AND ACETAMINOPHEN 5; 325 MG/1; MG/1
1 TABLET ORAL EVERY 6 HOURS PRN
Qty: 28 TABLET | Refills: 0 | Status: SHIPPED | OUTPATIENT
Start: 2024-12-26 | End: 2025-01-02

## 2024-12-26 RX ORDER — FAMOTIDINE 10 MG/ML
INJECTION, SOLUTION INTRAVENOUS
Status: DISCONTINUED | OUTPATIENT
Start: 2024-12-26 | End: 2024-12-26 | Stop reason: SDUPTHER

## 2024-12-26 RX ORDER — DEXAMETHASONE SODIUM PHOSPHATE 10 MG/ML
INJECTION, SOLUTION INTRAMUSCULAR; INTRAVENOUS
Status: DISCONTINUED | OUTPATIENT
Start: 2024-12-26 | End: 2024-12-26 | Stop reason: SDUPTHER

## 2024-12-26 RX ORDER — SODIUM CHLORIDE 9 MG/ML
INJECTION, SOLUTION INTRAVENOUS CONTINUOUS
Status: DISCONTINUED | OUTPATIENT
Start: 2024-12-26 | End: 2024-12-26 | Stop reason: HOSPADM

## 2024-12-26 RX ORDER — SCOLOPAMINE TRANSDERMAL SYSTEM 1 MG/1
1 PATCH, EXTENDED RELEASE TRANSDERMAL ONCE
Status: DISCONTINUED | OUTPATIENT
Start: 2024-12-26 | End: 2024-12-26 | Stop reason: HOSPADM

## 2024-12-26 RX ORDER — BACLOFEN 10 MG/1
10 TABLET ORAL 3 TIMES DAILY
Qty: 30 TABLET | Refills: 0 | Status: SHIPPED | OUTPATIENT
Start: 2024-12-26 | End: 2025-01-05

## 2024-12-26 RX ORDER — ONDANSETRON 2 MG/ML
INJECTION INTRAMUSCULAR; INTRAVENOUS
Status: DISCONTINUED | OUTPATIENT
Start: 2024-12-26 | End: 2024-12-26 | Stop reason: SDUPTHER

## 2024-12-26 RX ORDER — CIPROFLOXACIN 500 MG/1
500 TABLET, FILM COATED ORAL 2 TIMES DAILY
Qty: 20 TABLET | Refills: 0 | Status: SHIPPED | OUTPATIENT
Start: 2024-12-26 | End: 2025-01-05

## 2024-12-26 RX ORDER — CIPROFLOXACIN 2 MG/ML
400 INJECTION, SOLUTION INTRAVENOUS ONCE
Status: COMPLETED | OUTPATIENT
Start: 2024-12-26 | End: 2024-12-26

## 2024-12-26 RX ORDER — SODIUM CHLORIDE 0.9 % (FLUSH) 0.9 %
5-40 SYRINGE (ML) INJECTION PRN
Status: DISCONTINUED | OUTPATIENT
Start: 2024-12-26 | End: 2024-12-26 | Stop reason: HOSPADM

## 2024-12-26 RX ORDER — OXYCODONE HYDROCHLORIDE 5 MG/1
5 TABLET ORAL
Status: COMPLETED | OUTPATIENT
Start: 2024-12-26 | End: 2024-12-26

## 2024-12-26 RX ORDER — ONDANSETRON 4 MG/1
4 TABLET, ORALLY DISINTEGRATING ORAL 3 TIMES DAILY PRN
Qty: 21 TABLET | Refills: 0 | Status: SHIPPED | OUTPATIENT
Start: 2024-12-26

## 2024-12-26 RX ADMIN — DIPHENHYDRAMINE HYDROCHLORIDE 12.5 MG: 50 INJECTION INTRAMUSCULAR; INTRAVENOUS at 08:34

## 2024-12-26 RX ADMIN — SODIUM CHLORIDE: 9 INJECTION, SOLUTION INTRAVENOUS at 09:24

## 2024-12-26 RX ADMIN — MIDAZOLAM 2 MG: 1 INJECTION INTRAMUSCULAR; INTRAVENOUS at 07:49

## 2024-12-26 RX ADMIN — ROCURONIUM BROMIDE 50 MG: 10 INJECTION, SOLUTION INTRAVENOUS at 08:03

## 2024-12-26 RX ADMIN — FAMOTIDINE 20 MG: 10 INJECTION, SOLUTION INTRAVENOUS at 08:34

## 2024-12-26 RX ADMIN — ONDANSETRON 4 MG: 2 INJECTION, SOLUTION INTRAMUSCULAR; INTRAVENOUS at 10:04

## 2024-12-26 RX ADMIN — FENTANYL CITRATE 25 MCG: 50 INJECTION INTRAMUSCULAR; INTRAVENOUS at 10:11

## 2024-12-26 RX ADMIN — PROPOFOL 200 MG: 10 INJECTION, EMULSION INTRAVENOUS at 08:03

## 2024-12-26 RX ADMIN — SODIUM CHLORIDE: 9 INJECTION, SOLUTION INTRAVENOUS at 06:25

## 2024-12-26 RX ADMIN — BUPIVACAINE HYDROCHLORIDE 30 ML: 2.5 INJECTION, SOLUTION EPIDURAL; INFILTRATION; INTRACAUDAL; PERINEURAL at 08:14

## 2024-12-26 RX ADMIN — DEXAMETHASONE SODIUM PHOSPHATE 10 MG: 10 INJECTION INTRAMUSCULAR; INTRAVENOUS at 08:34

## 2024-12-26 RX ADMIN — CIPROFLOXACIN 400 MG: 2 INJECTION, SOLUTION INTRAVENOUS at 08:25

## 2024-12-26 RX ADMIN — PROMETHAZINE HYDROCHLORIDE 25 MG: 12.5 TABLET ORAL at 07:26

## 2024-12-26 RX ADMIN — SUGAMMADEX 200 MG: 100 INJECTION, SOLUTION INTRAVENOUS at 10:05

## 2024-12-26 RX ADMIN — OXYCODONE HYDROCHLORIDE 5 MG: 5 TABLET ORAL at 11:56

## 2024-12-26 RX ADMIN — SODIUM CHLORIDE: 9 INJECTION, SOLUTION INTRAVENOUS at 07:52

## 2024-12-26 RX ADMIN — BUPIVACAINE HYDROCHLORIDE 10 ML: 5 INJECTION, SOLUTION EPIDURAL; INTRACAUDAL; PERINEURAL at 08:14

## 2024-12-26 RX ADMIN — FENTANYL CITRATE 50 MCG: 50 INJECTION INTRAMUSCULAR; INTRAVENOUS at 10:09

## 2024-12-26 RX ADMIN — PROPOFOL 65 MCG/KG/MIN: 10 INJECTION, EMULSION INTRAVENOUS at 08:26

## 2024-12-26 RX ADMIN — FENTANYL CITRATE 25 MCG: 50 INJECTION INTRAMUSCULAR; INTRAVENOUS at 08:03

## 2024-12-26 RX ADMIN — LIDOCAINE HYDROCHLORIDE 5 ML: 20 INJECTION, SOLUTION EPIDURAL; INFILTRATION; INTRACAUDAL; PERINEURAL at 08:03

## 2024-12-26 RX ADMIN — ROPIVACAINE HYDROCHLORIDE 10 ML: 2 INJECTION, SOLUTION EPIDURAL; INFILTRATION at 08:14

## 2024-12-26 ASSESSMENT — LIFESTYLE VARIABLES: SMOKING_STATUS: 0

## 2024-12-26 ASSESSMENT — PAIN SCALES - GENERAL: PAINLEVEL_OUTOF10: 4

## 2024-12-26 ASSESSMENT — PAIN - FUNCTIONAL ASSESSMENT: PAIN_FUNCTIONAL_ASSESSMENT: 0-10

## 2024-12-26 ASSESSMENT — PAIN DESCRIPTION - LOCATION: LOCATION: BREAST

## 2024-12-26 ASSESSMENT — PAIN DESCRIPTION - ORIENTATION: ORIENTATION: RIGHT;LEFT

## 2024-12-26 NOTE — ANESTHESIA PRE PROCEDURE
Department of Anesthesiology  Preprocedure Note       Name:  Virginie Mejia   Age:  39 y.o.  :  1985                                          MRN:  [id number]         Date:  2024      Surgeon: Surgeon(s):  Zahira Trevino MD    Procedure: Procedure(s):  REMOVAL BILATERAL TISSUE EXPANDER, INSERT BILATERAL SILICONE BREAST IMPLANT, BIOPATACH AROUND ANABEL DRAIN, ADM, POSSIBLE CAPSULOTOMY, CAPSULORRAPHY AND CALSULECTOMY    Medications prior to admission:   Prior to Admission medications    Medication Sig Start Date End Date Taking? Authorizing Provider   FLUoxetine (PROZAC) 20 MG capsule Take 1 capsule by mouth daily 3/7/24  Yes Alejandra Perez MD   spironolactone (ALDACTONE) 100 MG tablet Take 1 tablet by mouth daily   Yes ProviderAlejandra MD       Current medications:    Current Facility-Administered Medications   Medication Dose Route Frequency Provider Last Rate Last Admin    lidocaine PF 1 % injection 1 mL  1 mL IntraDERmal Once PRN Kentrell Ibarra MD        0.9 % sodium chloride infusion   IntraVENous Continuous Kentrell Ibarra  mL/hr at 24 0625 New Bag at 24 0625    lactated ringers infusion   IntraVENous Continuous Kentrell Ibarra MD        sodium chloride flush 0.9 % injection 5-40 mL  5-40 mL IntraVENous 2 times per day Kentrell Ibarra MD        sodium chloride flush 0.9 % injection 5-40 mL  5-40 mL IntraVENous PRN Kentrell Ibarra MD        0.9 % sodium chloride infusion   IntraVENous PRN Kentrell Ibarra MD        povidone-iodine 10% in sodium chloride 0.9% irrigation             scopolamine (TRANSDERM-SCOP) transdermal patch 1 patch  1 patch TransDERmal Once Kentrell Ibarra MD        promethazine (PHENERGAN) tablet 25 mg  25 mg Oral Once Kentrell Ibarra MD           Allergies:    Allergies   Allergen Reactions    Ceclor [Cefaclor] Anaphylaxis    Cephalosporins Anaphylaxis     In childhood       Problem List:    Patient Active Problem List   Diagnosis Code    Fever and chills

## 2024-12-26 NOTE — H&P
cannot produce an exact replica of the removed breast.  Breast symmetry surgery on the opposite breast may be needed to produce similar size.  The nipple and darker skin surrounding it, called the areola, may be reconstructed in a subsequent procedure after the breast mound is created through tissue expansion.     Since May 2000, saline-filled breast implant and tissue expander devices have been approved by the [place] Food and Drug Administration (USFDA) for use in breast augmentation and reconstruction. The FDA approved silicone gel implants for use in breast augmentation and reconstruction in November 2006.     Patients undergoing breast surgery with tissue expanders and implants must consider the following:  Breast augmentation or reconstruction with implants may not be a one-time surgery.  Breast implants and tissue expanders of any type are not considered lifetime devices.  They cannot be expected to last forever.  You will likely require future surgery for implant replacement or removal.  Changes that occur to the breasts following augmentation or reconstruction with implants are not reversible.  There may be an unacceptable appearance to the breast if you later choose to have breast implants or tissue expanders removed.     A separate consent form for the placement of breast implants following breast reconstruction by tissue expansion is necessary.     ALTERNATIVE TREATMENTS  Breast reconstruction with tissue expander is an elective surgical operation.  Alternative treatments would consist of the use of external breast prostheses or padding, breast reconstruction without tissue expansion, or the transfer of other body tissues for breast reconstruction.  Potential risks and complications are associated with alternative surgical forms of treatment.     INHERENT RISKS OF BREAST RECONSTRUCTION WITH TISSUE EXPANDER  Every surgical procedure involves a certain amount of risk, and it is important that you  calcifications.     Chest Wall Irregularities:   Chest wall irregularities have been reported secondary to the use of tissue expanders and breast implants.  Residual skin irregularities at the ends of the incisions or “dog ears” are always a possibility when there is excessive redundant skin.  This may improve with time, or it can be surgically corrected.     Implant Displacement and Tissue Stretching:   Displacement, rotation, or migration of a breast implant or tissue expander may occur from its initial placement and can be accompanied by discomfort and/or distortion in breast shape (visible rippling of the skin).  Unusual techniques of placement may increase the risk of displacement or migration.  Additional surgery may be necessary to attempt to correct this problem.  It may not be possible to resolve this problem once it has occurred.     Surface Contamination:   Skin oil, lint from surgical drapes, or talc may become deposited on the surface of the tissue expander or implant at the time of insertion.  The consequences of this are unknown.     Unusual Activities and Occupations:   Activities and occupations that have the potential for trauma to the breast could potentially break or damage a tissue expander or implant, or cause bleeding/seroma.     MRI Examination During the Expansion Period:   Most of the expanders have a magnet at the injection site to allow for easier localization of the injection port during the expansion period.  MRI uses very strong magnetic fields which may cause movement, heating, or dislocation of the expander.  For this reason, patients with a breast expander in place should not undergo MRI imaging until the expander is removed and replaced with an implant.     Use of Acellular Dermal Matrix:   In order to place the expander in the right position and maintain that position, your plastic surgeon may choose to use biological materials.  Most commonly, these materials are derived from

## 2024-12-26 NOTE — ANESTHESIA POSTPROCEDURE EVALUATION
Department of Anesthesiology  Postprocedure Note    Patient: Virginie Mejia  MRN: [id number]  YOB: 1985  Date of evaluation: 12/26/2024    Procedure Summary       Date: 12/26/24 Room / Location: [address]    Anesthesia Start: 0753 Anesthesia Stop: 1031    Procedure: REMOVAL BILATERAL TISSUE EXPANDER, INSERT BILATERAL SILICONE BREAST IMPLANT, EXCISION OF  LEFT BREAST LESION , CAPSULECTOMY (Bilateral: Breast) Diagnosis:       HX: breast cancer      (HX: breast cancer [Z85.3])    Surgeons: Zahira Trevino MD Responsible Provider: Kentrell Ibarra MD    Anesthesia Type: general, regional ASA Status: 2            Anesthesia Type: No value filed.    Selwyn Phase I: Selwyn Score: 9    Selwyn Phase II: Selwyn Score: 10    Anesthesia Post Evaluation    Patient location during evaluation: PACU  Patient participation: complete - patient participated  Level of consciousness: awake  Airway patency: patent  Nausea & Vomiting: no nausea  Cardiovascular status: blood pressure returned to baseline  Respiratory status: acceptable  Hydration status: euvolemic  Comments: Multimodal analgesia pain management as indicated by procedure  Multimodal analgesia pain management approach  Pain management: adequate    No notable events documented.

## 2024-12-26 NOTE — ANESTHESIA PROCEDURE NOTES
Peripheral Block    Patient location during procedure: OR  Reason for block: procedure for pain, post-op pain management and at surgeon's request  Start time: 12/26/2024 8:14 AM  End time: 12/26/2024 8:24 AM  Staffing  Performed: anesthesiologist   Anesthesiologist: Kentrell Ibarra MD  Performed by: Kentrell Ibarra MD  Authorized by: Kentrell Ibarra MD    Preanesthetic Checklist  Completed: patient identified, IV checked, site marked, risks and benefits discussed, surgical/procedural consents, equipment checked, pre-op evaluation, timeout performed, anesthesia consent given, oxygen available, monitors applied/VS acknowledged, fire risk safety assessment completed and verbalized and blood product R/B/A discussed and consented  Peripheral Block   Patient position: supine  Prep: ChloraPrep  Provider prep: mask and sterile gloves  Patient monitoring: cardiac monitor, continuous pulse ox, continuous capnometry, frequent blood pressure checks, IV access, oxygen and responsive to questions  Block type: PECS II and PECS I  Laterality: bilateral  Injection technique: single-shot  Guidance: ultrasound guided  Anesthesia block local: 10mg decadron.  Infiltration strength: 0 %  Anesthesia block local: 10mg decadron.  Dose: 0 mL    Needle   Needle type: pencil-tip   Needle gauge: 20 G  Needle localization: ultrasound guidance  Assessment   Injection assessment: negative aspiration for heme, no paresthesia on injection, local visualized surrounding nerve on ultrasound and no intravascular symptoms  Paresthesia pain: none  Slow fractionated injection: yes  Hemodynamics: stable  Outcomes: patient tolerated procedure well and uncomplicated    Additional Notes  Needle tip visualized throughout with ultrasound, intraop  Medications Administered  BUPivacaine (MARCAINE) PF injection 0.25% - Perineural   30 mL - 12/26/2024 8:14:00 AM  BUPivacaine (MARCAINE) PF injection 0.5% - Perineural   10 mL - 12/26/2024 8:14:00 AM  ropivacaine

## 2024-12-26 NOTE — OP NOTE
Operative Note      Patient: Virginie Mejia  YOB: 1985  MRN: [id number]    Date of Procedure: 12/26/2024    Pre-Op Diagnosis Codes:      * HX: breast cancer [Z85.3]    Post-Op Diagnosis: Same       Procedure(s):  REMOVAL BILATERAL TISSUE EXPANDER   INSERT BILATERAL SILICONE BREAST IMPLANT 700 cc ultrahigh silicone implants [place]  , EXCISION OF  LEFT BREAST LESION measuring 0.9 x 0.5 cm with simple closure  , CAPSULECTOMY    Surgeon(s):  Zahira Trevino MD    Assistant:   Surgical Assistant: Lorenzo Brown    Anesthesia: General    Estimated Blood Loss (mL): Minimal    Complications: None    Specimens:   ID Type Source Tests Collected by Time Destination   A : Left breast lesion ( suture 12 o'clock) Tissue Breast SURGICAL PATHOLOGY Zahira Trevino MD 12/26/2024 0952        Implants:  Implant Name Type Inv. Item Serial No.  Lot No. LRB No. Used Action   IMPLANT BRST 700CC DIA13.3CM P6.5CM NADYA GEL SMOOTH RND HI - [id number]  IMPLANT BRST 700CC DIA13.3CM P6.5CM NADYA GEL SMOOTH RND HI  MENTOR LI-WD [id number] Right 1 Implanted   IMPLANT BRST 700CC DIA13.3CM P6.5CM NADYA GEL SMOOTH RND HI - [id number]  IMPLANT BRST 700CC DIA13.3CM P6.5CM NADYA GEL SMOOTH RND HI  MENTOR LI-WD [id number] Left 1 Implanted         Drains: * No LDAs found *    Findings:  Infection Present At Time Of Surgery (PATOS) (choose all levels that have infection present):  No infection present      INDICATION FOR PROCEDURE:  The patient is a 39 y.o. female, who has undergone bilateral mastectomies.  Patient had bilateral expanders placed.  Patient is here for bilateral implants. All the risks, benefits, and alternative treatments were explained to the patient and an informed consent was obtained.  The patient was marked in the holding area.  The circumference of the implants were marked.      DESCRIPTION OF PROCEDURE:  The patient was marked in the holding area. .  The patient was brought into the room, was placed on the table

## 2024-12-26 NOTE — H&P
diarrhea and constipation.   Skin: Negative for pallor and rash.   Neurological: Negative for seizures, syncope and numbness.   Hematological: Does not bruise/bleed easily.   Psychiatric/Behavioral: Patient has a history of anxiety and depression.    Past Medical History:   Diagnosis Date    Psychiatric problem     depression and anxiety     Past Surgical History:   Procedure Laterality Date    ADENOIDECTOMY      TONSILLECTOMY       Social History     Socioeconomic History    Marital status:      Spouse name: Not on file    Number of children: Not on file    Years of education: Not on file    Highest education level: Not on file   Occupational History    Not on file   Tobacco Use    Smoking status: Former     Current packs/day: 0.00     Average packs/day: 1 pack/day for 7.0 years (7.0 ttl pk-yrs)     Types: Cigarettes     Start date: 2008     Quit date: 2015     Years since quittin.9    Smokeless tobacco: Not on file   Substance and Sexual Activity    Alcohol use: Yes     Comment: occasionally    Drug use: No    Sexual activity: Yes     Partners: Male   Other Topics Concern    Not on file   Social History Narrative    Not on file     Social Determinants of Health     Financial Resource Strain: Not on file   Food Insecurity: Not on file   Transportation Needs: Not on file   Physical Activity: Not on file   Stress: Not on file   Social Connections: Not on file   Intimate Partner Violence: Not on file   Housing Stability: Not on file     Family History   Problem Relation Age of Onset    Depression Mother     Asthma Father     Cancer Father     Diabetes Father     High Blood Pressure Father     High Cholesterol Father     Birth Defects Brother     Learning Disabilities Brother     Mental Illness Brother     Cancer Paternal Aunt     Cancer Maternal Grandmother     Diabetes Maternal Grandmother     Diabetes Maternal Grandfather     Diabetes Paternal Grandmother     Diabetes Paternal Grandfather   recommended treatment and the final results.  If the breasts are not the same size or shape before surgery, it is unlikely that they will be completely symmetrical afterward.    Conditions that involve sagging of the breast or diminished skin tone (stretch marks) may require additional surgical procedures (breast lift) to reposition the nipple and areola upward and to remove loose skin.    Patients undergoing augmentation mammaplasty surgery must consider the following:  Breast augmentation or reconstruction with silicone gel-filled implants may not be a one-time surgery.  Breast implants of any type are not considered lifetime devices.  They cannot be expected to last forever.  You will likely require future surgery for implant replacement or removal.  Changes that occur to the breasts following augmentation or reconstruction with implants are not reversible.  There may be an unacceptable appearance to the breast if you later choose to have breast implants removed.    ALTERNATIVE TREATMENTS  Augmentation mammaplasty with silicone gel-filled implants is an elective surgical operation.  Alternative treatment would consist of not undergoing the surgical procedure or use of external breast prostheses or padding, saline-filled implants, or the transfer of other body tissues to enlarge/rebuild breast size.  Risks and potential complications are also associated with alternative surgical forms of treatment.      RISKS OF AUGMENTATION MAMMAPLASTY SURGERY  Every surgical procedure involves a certain amount of risk and it is important that you understand these risks and the possible complications associated with them.  In addition, every procedure has limitations.    Additional information concerning breast implants may be obtained from the USFDA, package-insert sheets supplied by the implant , or other information pamphlets required by individual state laws.    An individual's choice to undergo a surgical

## 2024-12-26 NOTE — DISCHARGE INSTRUCTIONS
-- Scope patch used for post-op nausea and vomiting is good for 72 hours from the point of placement.  Remove any time within the 72 hour time frame when you're nausea is under control.  When removing, use a glove or a napkin and then dispose.    What are the possible side effects of scopolamine transdermal?  Get emergency medical help if you have signs of an allergic reaction: hives; difficulty breathing; swelling of your face, lips, tongue, or throat.  Remove the skin patch and call your doctor at once if you have:  eye pain or redness, blurred vision, dilated pupils;  decreased urination, painful or difficult urination;  stomach pain, nausea, vomiting;  a seizure; or  confusion, agitation, extreme fear, hallucinations, unusual thoughts or behavior.  Common side effects may include:  drowsiness, dizziness;  dry mouth;  eye redness, dryness, or itching;  feeling restless;  memory problems; or  mild itching or skin rash.  This is not a complete list of side effects and others may occur. Call your doctor for medical advice about side effects. You may report side effects to FDA at 7-337-FDA-2641.    What other drugs will affect scopolamine transdermal?  Scopolamine slows the digestive tract, which can make it harder for your body to absorb other medicines you take by mouth. Tell your doctor if any of your oral medications do not seem to work as well while you are using scopolamine transdermal.  Using scopolamine with other drugs that make you sleepy can worsen this effect. Ask your doctor before taking a sleeping pill, narcotic medication, muscle relaxer, or medicine for anxiety, depression, or seizures.  Tell your doctor about all your current medicines and any you start or stop using, especially:  atropine;  cold or allergy medicine that contains an antihistamine (Benadryl and others);  medicine to treat Parkinson's disease;  medicine to treat excess stomach acid, stomach ulcer, motion sickness, or irritable bowel  as possible.    If you have a Biopatch in place, leave it intact until office visit.    f you have drains clean the drain site with Hydrogen peroxide and place antibiotic ointment around it and cover with drain sponge.  This is to be done daily or as needed.    If you have Dermabond (or glue) on your incision, DO NOT use any ointments or creams as these will break the glue down. The glue is there to help the sutures stay secure.  You may keep the area cover with a plain bandage such as ABD, or 4x4.      Call the office if any of the following occur:   Excessive bleeding from the incision   Excessive swelling   Excessive redness  Extreme throbbing  Fever over 100.4°F.  Foul smelling drainage      Our office numbers are as follows:  [place] office:     [phone]

## 2024-12-27 LAB — SURGICAL PATHOLOGY REPORT: NORMAL

## 2024-12-30 ENCOUNTER — PATIENT MESSAGE (OUTPATIENT)
Dept: SURGERY | Age: 39
End: 2024-12-30

## 2024-12-30 ENCOUNTER — TELEPHONE (OUTPATIENT)
Dept: SURGERY | Age: 39
End: 2024-12-30

## 2024-12-30 NOTE — TELEPHONE ENCOUNTER
Spoke to patient to follow up after surgery. Patient states she is doing well, she has one area on her left side by her wound vac that is slightly irritated. Patient isn't having any pain or discomfort. Writer explained to patient she needs to contact the office if the area is bothering her becomes red or painful and patient will be scheduled for a nurse visit. Patient verbalized understanding.

## 2024-12-31 NOTE — TELEPHONE ENCOUNTER
Patient informed office that she was able to find the leak from her wound vac. Patient was able to stop the leak. Patient was instructed to contact the office if any concerns.  Dr Trevino was notified via text

## 2025-01-08 ENCOUNTER — OFFICE VISIT (OUTPATIENT)
Dept: SURGERY | Age: 40
End: 2025-01-08

## 2025-01-08 VITALS
HEIGHT: 67 IN | WEIGHT: 182 LBS | HEART RATE: 85 BPM | DIASTOLIC BLOOD PRESSURE: 78 MMHG | BODY MASS INDEX: 28.56 KG/M2 | SYSTOLIC BLOOD PRESSURE: 128 MMHG

## 2025-01-08 DIAGNOSIS — Z98.890 POSTOPERATIVE STATE: Primary | ICD-10-CM

## 2025-01-08 PROCEDURE — 99024 POSTOP FOLLOW-UP VISIT: CPT | Performed by: PLASTIC SURGERY

## 2025-01-08 NOTE — PROGRESS NOTES
[facility] PHYSICIANS [facility] SURGICAL SPECIALISTS  [address]  [facility] [address]       OFFICE POST-OP NOTE    Patient Name:  Virginie Mejia    :  1985    MRN:  [id number]  STATUS POST  Chief Complaint   Patient presents with    Post-Op Check     Post op- Bilat silicone placement DOS 25       SUBJECTIVE  Patient seen and examined.  Patient is status post removal of bilateral tissue expanders.  Patient is status post insertion of bilateral silicone breast implant 700 cc ultrahigh.  Patient is also status post excision of the left breast lesion measuring 0.9 x 0.5 cm with simple closure.  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    PHYSICAL EXAM  Vital Signs:  /78 (Site: Right Upper Arm, Position: Sitting, Cuff Size: Large Adult)   Pulse 85   Ht 1.702 m (5' 7\")   Wt 82.6 kg (182 lb)   BMI 28.51 kg/m²     Incisions:  Suture line clean dry and intact.    Skin:  No evidence of infection.   Neurologic:  Alert & oriented x 3.    ASSESSMENT   Diagnosis Orders   1. Postoperative state            PLAN  1.  Start breast exercises  Continue compression bra  Follow-up in 1 week.    Plan discussed with patient.    Electronically signed by:  STEPHANY COSTELLO M.D.   2025

## 2025-01-22 ENCOUNTER — OFFICE VISIT (OUTPATIENT)
Dept: SURGERY | Age: 40
End: 2025-01-22

## 2025-01-22 VITALS
BODY MASS INDEX: 28.09 KG/M2 | SYSTOLIC BLOOD PRESSURE: 112 MMHG | OXYGEN SATURATION: 98 % | DIASTOLIC BLOOD PRESSURE: 74 MMHG | HEIGHT: 67 IN | WEIGHT: 179 LBS | HEART RATE: 58 BPM

## 2025-01-22 DIAGNOSIS — Z98.890 POSTOPERATIVE STATE: Primary | ICD-10-CM

## 2025-01-22 PROCEDURE — 99024 POSTOP FOLLOW-UP VISIT: CPT | Performed by: PLASTIC SURGERY

## 2025-01-22 NOTE — PROGRESS NOTES
[facility] PHYSICIANS [facility] SURGICAL SPECIALISTS  [address]  [facility] [address]       OFFICE POST-OP NOTE    Patient Name:  Virginie Mejia    :  1985    MRN:  [id number]  STATUS POST  Chief Complaint   Patient presents with    Post-Op Check     Bilateral Silicone Placement, DOS: 2024       SUBJECTIVE  Patient seen and examined.  Patient is status post removal of bilateral tissue expanders.  Patient is status post insertion of bilateral silicone breast implant 700 cc ultrahigh.  Patient is also status post excision of the left breast lesion measuring 0.9 x 0.5 cm with simple closure.  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    PHYSICAL EXAM  Vital Signs:  /74 (Site: Right Upper Arm, Position: Sitting, Cuff Size: Medium Adult)   Pulse 58   Ht 1.702 m (5' 7\")   Wt 81.2 kg (179 lb)   SpO2 98%   BMI 28.04 kg/m²     Incisions:  Suture line clean dry and intact.    Breast: Patient has good symmetry  Skin:  No evidence of infection.   Neurologic:  Alert & oriented x 3.    ASSESSMENT   Diagnosis Orders   1. Postoperative state            PLAN  Continue compression bra  Continue exercises  We will remove the sutures from where the nevus was resected.  Patient is to follow-up in 1 month.    Plan discussed with patient.    Electronically signed by:  STEPHANY COSTELLO M.D.   2025

## 2025-02-19 ENCOUNTER — OFFICE VISIT (OUTPATIENT)
Dept: SURGERY | Age: 40
End: 2025-02-19

## 2025-02-19 VITALS
DIASTOLIC BLOOD PRESSURE: 74 MMHG | HEART RATE: 73 BPM | BODY MASS INDEX: 28.09 KG/M2 | HEIGHT: 67 IN | SYSTOLIC BLOOD PRESSURE: 119 MMHG | WEIGHT: 179 LBS

## 2025-02-19 DIAGNOSIS — Z98.890 POSTOPERATIVE STATE: Primary | ICD-10-CM

## 2025-02-19 PROCEDURE — 99024 POSTOP FOLLOW-UP VISIT: CPT | Performed by: PLASTIC SURGERY

## 2025-02-19 NOTE — PROGRESS NOTES
[facility] PHYSICIANS [facility], [facility] SURGICAL SPECIALISTS  [address]  [facility] [address]       OFFICE POST-OP NOTE    Patient Name:  Virginie Mejia    :  1985    MRN:  [id number]  STATUS POST  Chief Complaint   Patient presents with    Post-Op Check     Bilateral Silicone Placement, DOS: 2024       SUBJECTIVE  Patient seen and examined.  Patient is status post removal of bilateral tissue expanders.  Patient is status post insertion of bilateral silicone breast implant 700 cc ultrahigh.  Patient is also status post excision of the left breast lesion measuring 0.9 x 0.5 cm with simple closure.  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    PHYSICAL EXAM  Vital Signs:  /74 (Site: Left Upper Arm, Position: Sitting, Cuff Size: Small Adult)   Pulse 73   Ht 1.702 m (5' 7\")   Wt 81.2 kg (179 lb)   BMI 28.04 kg/m²     Incisions:  Suture line clean dry and intact.    Breast: Patient has good symmetry  Skin:  No evidence of infection.   Neurologic:  Alert & oriented x 3.    ASSESSMENT   Diagnosis Orders   1. Postoperative state            PLAN  Continue compression bra  Continue exercises  .  Patient is to follow-up in 6 months    Plan discussed with patient.    Electronically signed by:  STEPHANY COSTELLO M.D.   2025

## 2025-02-20 ENCOUNTER — TELEPHONE (OUTPATIENT)
Dept: SURGERY | Age: 40
End: 2025-02-20

## 2025-02-20 NOTE — TELEPHONE ENCOUNTER
Left voicemail for patient to call the office and reschedule her 02/24 appointment to the next available.

## 2025-04-16 ENCOUNTER — TELEPHONE (OUTPATIENT)
Dept: SURGERY | Age: 40
End: 2025-04-16

## 2025-04-16 NOTE — TELEPHONE ENCOUNTER
Writer left voicemail with patient, letting her know there is a follow up appt available on 5/6 at [place], otherwise the next f/u appt at [place] won't be until June. Left office number/

## 2025-04-16 NOTE — TELEPHONE ENCOUNTER
Patient needed to cancel Post Op for 04/16/2025. Patient will need a call back from clinical staff to get rescheduled. Writer did not see an appointment for over a month.     Please advise.

## 2025-07-05 ENCOUNTER — OFFICE VISIT (OUTPATIENT)
Dept: PRIMARY CARE CLINIC | Age: 40
End: 2025-07-05

## 2025-07-05 VITALS
TEMPERATURE: 98.6 F | SYSTOLIC BLOOD PRESSURE: 102 MMHG | HEART RATE: 85 BPM | HEIGHT: 68 IN | DIASTOLIC BLOOD PRESSURE: 90 MMHG | OXYGEN SATURATION: 98 % | BODY MASS INDEX: 26.52 KG/M2 | WEIGHT: 175 LBS

## 2025-07-05 DIAGNOSIS — Z02.5 SPORTS PHYSICAL: Primary | ICD-10-CM

## 2025-07-05 PROCEDURE — SWPH SPORTS/WORK PERMIT PHYSICAL

## 2025-07-05 ASSESSMENT — PATIENT HEALTH QUESTIONNAIRE - PHQ9: DEPRESSION UNABLE TO ASSESS: PT REFUSES

## 2025-08-20 ENCOUNTER — OFFICE VISIT (OUTPATIENT)
Dept: SURGERY | Age: 40
End: 2025-08-20
Payer: COMMERCIAL

## 2025-08-20 VITALS
HEIGHT: 67 IN | SYSTOLIC BLOOD PRESSURE: 133 MMHG | WEIGHT: 176 LBS | BODY MASS INDEX: 27.62 KG/M2 | HEART RATE: 68 BPM | DIASTOLIC BLOOD PRESSURE: 88 MMHG

## 2025-08-20 DIAGNOSIS — Z90.10 STATUS POST MASTECTOMY, UNSPECIFIED LATERALITY: Primary | ICD-10-CM

## 2025-08-20 DIAGNOSIS — Z85.3 HX: BREAST CANCER: ICD-10-CM

## 2025-08-20 PROCEDURE — 99213 OFFICE O/P EST LOW 20 MIN: CPT | Performed by: PLASTIC SURGERY

## (undated) DEVICE — BRA COMPR XL NYL LYCRA SPANDEX WHT CURAD

## (undated) DEVICE — DRESSING NEG PRESSURE 21X19 CM STRL PREVENA RESTOR ARTH FRM

## (undated) DEVICE — BLADE,CARBON-STEEL,15,STRL,DISPOSABLE,TB: Brand: MEDLINE

## (undated) DEVICE — GOWN,NON-REINFORCED,3XL: Brand: MEDLINE

## (undated) DEVICE — SUTURE VICRYL + SZ 2-0 L27IN ABSRB WHT SH 1/2 CIR TAPERCUT VCP417H

## (undated) DEVICE — 450 ML BOTTLE OF 0.05% CHLORHEXIDINE GLUCONATE IN 99.95% STERILE WATER FOR IRRIGATION, USP AND APPLICATOR.: Brand: IRRISEPT ANTIMICROBIAL WOUND LAVAGE

## (undated) DEVICE — RETRACTOR 90X30MM WITH BUILT-IN SINGLE-USE MULTI-LED LIGHT SOURCE - STERILE: Brand: ONETRAC LX

## (undated) DEVICE — SUTURE PROL SZ 2-0 L18IN NONABSORBABLE BLU FS L26MM 3/8 CIR 8685H

## (undated) DEVICE — 4-PORT MANIFOLD: Brand: NEPTUNE 2

## (undated) DEVICE — DRESSING KIT 21X19 CM PREVENA RESTOR BELLA FRM

## (undated) DEVICE — SUTURE VICRYL SZ 2-0 L18IN ABSRB UD CT-1 L36MM 1/2 CIR J839D

## (undated) DEVICE — LUER-LOK 360°: Brand: CONNECTA, LUER-LOK

## (undated) DEVICE — STRAP ARMBRD W1.5XL32IN FOAM STR YET SFT W/ HK AND LOOP

## (undated) DEVICE — SYSTEM WND THER 14 DAY 150 CC CANSTR THER UNIT PREVENA + 125

## (undated) DEVICE — STAZ MAJOR BASIN: Brand: MEDLINE INDUSTRIES, INC.

## (undated) DEVICE — 3M™ IOBAN™ 2 ANTIMICROBIAL INCISE DRAPE 6650EZ: Brand: IOBAN™ 2

## (undated) DEVICE — STRIP,CLOSURE,WOUND,MEDI-STRIP,1/2X4: Brand: MEDLINE

## (undated) DEVICE — SUTURE VICRYL SZ 3-0 L54IN ABSRB UD LIGAPAK REEL POLYGLACTIN J285G

## (undated) DEVICE — KIT TISS EXP W/ 60ML SYR 122CM TRNSF SET PIERCING DEV L25CM

## (undated) DEVICE — SUTURE NONABSORBABLE MONOFILAMENT 2-0 FS 18 IN ETHILON 664H

## (undated) DEVICE — 1010 S-DRAPE TOWEL DRAPE 10/BX: Brand: STERI-DRAPE™

## (undated) DEVICE — SYRINGE MED 50ML LUERLOCK TIP

## (undated) DEVICE — BLADE ES ELASTOMERIC COAT INSUL DURABLE BEND UPTO 90DEG

## (undated) DEVICE — SUTURE PERMA-HAND SZ 2-0 L30IN NONABSORBABLE BLK L26MM SH K833H

## (undated) DEVICE — MARKER,SKIN,WI/RULER AND LABELS: Brand: MEDLINE

## (undated) DEVICE — BLANKET WRM W40.2XL55.9IN IORT LO BODY + MISTRAL AIR

## (undated) DEVICE — TOWEL,OR,DSP,ST,BLUE,DLX,XR,4/PK,20PK/CS: Brand: MEDLINE

## (undated) DEVICE — SOLUTION IRRIG 1000ML 0.9% SOD CHL USP POUR PLAS BTL

## (undated) DEVICE — STAZ MAJOR PLASTIC: Brand: MEDLINE INDUSTRIES, INC.

## (undated) DEVICE — DRAPE,T,LAPARO,TRANS,STERILE: Brand: MEDLINE

## (undated) DEVICE — PAD PT POS 36 IN SURGYPAD DISP

## (undated) DEVICE — NEPTUNE E-SEP 165MM SUCTION SLEEVE: Brand: NEPTUNE E-SEP

## (undated) DEVICE — AEGIS 1" DISK 4MM HOLE, PEEL OPEN: Brand: MEDLINE

## (undated) DEVICE — PADDING UNDERCAST W6INXL4YD RAYON POLY SYN NONADHESIVE

## (undated) DEVICE — SUTURE MONOCRYL STRATAFIX SPRL + SZ 2-0 L18IN ABSRB UD CT-1 SXMP1B413

## (undated) DEVICE — STERILE POLYISOPRENE POWDER-FREE SURGICAL GLOVES WITH EMOLLIENT COATING: Brand: PROTEXIS

## (undated) DEVICE — ELECTRODE PT RET AD L9FT HI MOIST COND ADH HYDRGEL CORDED

## (undated) DEVICE — SUTURE ETHILON SZ 2-0 L18IN NONABSORBABLE BLK L19MM PS-2 PRIM 593H

## (undated) DEVICE — SYRINGE IRRIG 60ML SFT PLIABLE BLB EZ TO GRP 1 HND USE W/

## (undated) DEVICE — RESERVOIR,SUCTION,100CC,SILICONE: Brand: MEDLINE

## (undated) DEVICE — SUTURE MONOCRYL STRATAFIX SPRL + 3 0 SGL ARMED PS 1 24 IN LEN SXMP1B103

## (undated) DEVICE — PAD,ABDOMINAL,5"X9",ST,LF,25/BX: Brand: MEDLINE INDUSTRIES, INC.

## (undated) DEVICE — DRAIN SURG 19FR 100% SIL RADPQ RND CHN FULL FLUT

## (undated) DEVICE — DRAPE,REIN 53X77,STERILE: Brand: MEDLINE

## (undated) DEVICE — GLOVE SURG SZ 75 CRM LTX FREE POLYISOPRENE POLYMER BEAD ANTI

## (undated) DEVICE — SPONGE LAP W18XL18IN WHT COT 4 PLY FLD STRUNG RADPQ DISP ST 2 PER PACK

## (undated) DEVICE — SUTURE VICRYL + SZ 3-0 L27IN ABSRB UD L26MM SH 1/2 CIR VCP416H

## (undated) DEVICE — SUTURE VICRYL + SZ 2-0 L36IN ABSRB UD L36MM CT-1 1/2 CIR VCP945H

## (undated) DEVICE — RETRACTOR 135X20MM WITH BUILT-IN SINGLE-USE MULTI-LED LIGHT SOURCE - STERILE: Brand: ONETRAC LX

## (undated) DEVICE — DRAPE,UTILTY,TAPE,15X26, 4EA/PK: Brand: MEDLINE

## (undated) DEVICE — MASTISOL ADHESIVE LIQ 2/3ML

## (undated) DEVICE — ULTRA HIGH PROFILE, UH 700CC GEL SIZER: Brand: MENTOR MEMORYGEL RESTERILIZABLE GEL SIZER

## (undated) DEVICE — SYRINGE MED 5ML STD CLR PLAS LUERLOCK TIP N CTRL DISP

## (undated) DEVICE — SUTURE PROL SZ 6-0 L18IN NONABSORBABLE BLU L36MM P-1 3/8 8697G

## (undated) DEVICE — GAUZE,SPONGE,FLUFF,6"X6.75",STRL,5/TRAY: Brand: MEDLINE

## (undated) DEVICE — SUTURE PDS II SZ 2-0 L27IN ABSRB VLT SH L26MM 1/2 CIR Z317H

## (undated) DEVICE — BANDAGE,GAUZE,CONFORMING,6"X80",STRL,LF: Brand: MEDLINE

## (undated) DEVICE — GLOVE SURG SZ 75 L12IN FNGR THK79MIL GRN LTX FREE

## (undated) DEVICE — CONTAINER,SPECIMEN,OR STERILE,4OZ: Brand: MEDLINE

## (undated) DEVICE — SUTURE VICRYL SZ 3-0 L18IN ABSRB UD L26MM SH 1/2 CIR J864D

## (undated) DEVICE — NEEDLE HYPO 25GA L1.5IN BLU POLYPR HUB S STL REG BVL STR

## (undated) DEVICE — APPLIER LIG CLP M L11IN TI STR RNG HNDL FOR 20 CLP DISP

## (undated) DEVICE — CONNECTOR NEG PRSS WHT PLAS Y DISP

## (undated) DEVICE — SHEET, T, LAPAROTOMY, STERILE: Brand: MEDLINE